# Patient Record
Sex: FEMALE | Race: WHITE | NOT HISPANIC OR LATINO | Employment: PART TIME | URBAN - METROPOLITAN AREA
[De-identification: names, ages, dates, MRNs, and addresses within clinical notes are randomized per-mention and may not be internally consistent; named-entity substitution may affect disease eponyms.]

---

## 2024-04-04 LAB
EXTERNAL ABO GROUPING: NORMAL
EXTERNAL HEPATITIS B SURFACE ANTIGEN: NEGATIVE
EXTERNAL HEPATITIS C AB: NORMAL
EXTERNAL RH FACTOR: POSITIVE
EXTERNAL RUBELLA QUALITATIVE: NORMAL
EXTERNAL SYPHILIS RPR SCREEN: NORMAL
HIV1 P24 AG SERPL QL IA: NORMAL

## 2024-10-10 LAB — EXTERNAL GROUP B STREP ANTIGEN: NORMAL

## 2024-11-09 ENCOUNTER — ANESTHESIA EVENT (OUTPATIENT)
Dept: LABOR AND DELIVERY | Facility: HOSPITAL | Age: 34
End: 2024-11-09
Payer: COMMERCIAL

## 2024-11-09 ENCOUNTER — HOSPITAL ENCOUNTER (INPATIENT)
Facility: HOSPITAL | Age: 34
LOS: 3 days | Discharge: HOME | End: 2024-11-12
Attending: OBSTETRICS & GYNECOLOGY | Admitting: STUDENT IN AN ORGANIZED HEALTH CARE EDUCATION/TRAINING PROGRAM
Payer: COMMERCIAL

## 2024-11-09 ENCOUNTER — ANESTHESIA (OUTPATIENT)
Dept: LABOR AND DELIVERY | Facility: HOSPITAL | Age: 34
End: 2024-11-09
Payer: COMMERCIAL

## 2024-11-09 DIAGNOSIS — Z98.891 STATUS POST PRIMARY LOW TRANSVERSE CESAREAN SECTION: Primary | ICD-10-CM

## 2024-11-09 PROBLEM — Z34.90 PREGNANCY: Status: ACTIVE | Noted: 2024-11-09

## 2024-11-09 LAB
A1 MICROGLOB PLACENTAL VAG QL: POSITIVE
ABO GROUP BLD: NORMAL
ALBUMIN SERPL-MCNC: 3.8 G/DL (ref 3.5–5.2)
ALBUMIN/GLOB SERPL: 1.5 G/DL
ALP SERPL-CCNC: 149 U/L (ref 39–117)
ALT SERPL W P-5'-P-CCNC: 13 U/L (ref 1–33)
ANION GAP SERPL CALCULATED.3IONS-SCNC: 11.6 MMOL/L (ref 5–15)
AST SERPL-CCNC: 18 U/L (ref 1–32)
BASOPHILS # BLD AUTO: 0.03 10*3/MM3 (ref 0–0.2)
BASOPHILS NFR BLD AUTO: 0.3 % (ref 0–1.5)
BILIRUB SERPL-MCNC: <0.2 MG/DL (ref 0–1.2)
BILIRUB UR QL STRIP: NEGATIVE
BLD GP AB SCN SERPL QL: NEGATIVE
BUN SERPL-MCNC: 7 MG/DL (ref 6–20)
BUN/CREAT SERPL: 11.7 (ref 7–25)
CALCIUM SPEC-SCNC: 9.4 MG/DL (ref 8.6–10.5)
CHLORIDE SERPL-SCNC: 108 MMOL/L (ref 98–107)
CLARITY UR: CLEAR
CO2 SERPL-SCNC: 19.4 MMOL/L (ref 22–29)
COLOR UR: YELLOW
CREAT SERPL-MCNC: 0.6 MG/DL (ref 0.57–1)
DEPRECATED RDW RBC AUTO: 40.1 FL (ref 37–54)
EGFRCR SERPLBLD CKD-EPI 2021: 121.7 ML/MIN/1.73
EOSINOPHIL # BLD AUTO: 0.07 10*3/MM3 (ref 0–0.4)
EOSINOPHIL NFR BLD AUTO: 0.6 % (ref 0.3–6.2)
ERYTHROCYTE [DISTWIDTH] IN BLOOD BY AUTOMATED COUNT: 12.9 % (ref 12.3–15.4)
GLOBULIN UR ELPH-MCNC: 2.5 GM/DL
GLUCOSE SERPL-MCNC: 81 MG/DL (ref 65–99)
GLUCOSE UR STRIP-MCNC: NEGATIVE MG/DL
HCT VFR BLD AUTO: 40 % (ref 34–46.6)
HGB BLD-MCNC: 14 G/DL (ref 12–15.9)
HGB UR QL STRIP.AUTO: NEGATIVE
IMM GRANULOCYTES # BLD AUTO: 0.04 10*3/MM3 (ref 0–0.05)
IMM GRANULOCYTES NFR BLD AUTO: 0.4 % (ref 0–0.5)
KETONES UR QL STRIP: NEGATIVE
LEUKOCYTE ESTERASE UR QL STRIP.AUTO: NEGATIVE
LYMPHOCYTES # BLD AUTO: 1.42 10*3/MM3 (ref 0.7–3.1)
LYMPHOCYTES NFR BLD AUTO: 12.9 % (ref 19.6–45.3)
MCH RBC QN AUTO: 30.6 PG (ref 26.6–33)
MCHC RBC AUTO-ENTMCNC: 35 G/DL (ref 31.5–35.7)
MCV RBC AUTO: 87.3 FL (ref 79–97)
MONOCYTES # BLD AUTO: 0.58 10*3/MM3 (ref 0.1–0.9)
MONOCYTES NFR BLD AUTO: 5.3 % (ref 5–12)
NEUTROPHILS NFR BLD AUTO: 8.9 10*3/MM3 (ref 1.7–7)
NEUTROPHILS NFR BLD AUTO: 80.5 % (ref 42.7–76)
NITRITE UR QL STRIP: NEGATIVE
NRBC BLD AUTO-RTO: 0 /100 WBC (ref 0–0.2)
PH UR STRIP.AUTO: 6.5 [PH] (ref 5–8)
PLATELET # BLD AUTO: 173 10*3/MM3 (ref 140–450)
PMV BLD AUTO: 12.2 FL (ref 6–12)
POTASSIUM SERPL-SCNC: 4 MMOL/L (ref 3.5–5.2)
PROT SERPL-MCNC: 6.3 G/DL (ref 6–8.5)
PROT UR QL STRIP: NEGATIVE
RBC # BLD AUTO: 4.58 10*6/MM3 (ref 3.77–5.28)
RH BLD: POSITIVE
RPR SER QL: NORMAL
SODIUM SERPL-SCNC: 139 MMOL/L (ref 136–145)
SP GR UR STRIP: 1.01 (ref 1–1.03)
T&S EXPIRATION DATE: NORMAL
TREPONEMA PALLIDUM IGG+IGM AB [PRESENCE] IN SERUM OR PLASMA BY IMMUNOASSAY: NORMAL
UROBILINOGEN UR QL STRIP: NORMAL
WBC NRBC COR # BLD AUTO: 11.04 10*3/MM3 (ref 3.4–10.8)

## 2024-11-09 PROCEDURE — 86850 RBC ANTIBODY SCREEN: CPT | Performed by: STUDENT IN AN ORGANIZED HEALTH CARE EDUCATION/TRAINING PROGRAM

## 2024-11-09 PROCEDURE — 25810000003 LACTATED RINGERS SOLUTION: Performed by: ANESTHESIOLOGY

## 2024-11-09 PROCEDURE — 86780 TREPONEMA PALLIDUM: CPT | Performed by: STUDENT IN AN ORGANIZED HEALTH CARE EDUCATION/TRAINING PROGRAM

## 2024-11-09 PROCEDURE — 86592 SYPHILIS TEST NON-TREP QUAL: CPT | Performed by: STUDENT IN AN ORGANIZED HEALTH CARE EDUCATION/TRAINING PROGRAM

## 2024-11-09 PROCEDURE — 86900 BLOOD TYPING SEROLOGIC ABO: CPT | Performed by: STUDENT IN AN ORGANIZED HEALTH CARE EDUCATION/TRAINING PROGRAM

## 2024-11-09 PROCEDURE — 85025 COMPLETE CBC W/AUTO DIFF WBC: CPT | Performed by: STUDENT IN AN ORGANIZED HEALTH CARE EDUCATION/TRAINING PROGRAM

## 2024-11-09 PROCEDURE — 81003 URINALYSIS AUTO W/O SCOPE: CPT | Performed by: OBSTETRICS & GYNECOLOGY

## 2024-11-09 PROCEDURE — 86901 BLOOD TYPING SEROLOGIC RH(D): CPT | Performed by: STUDENT IN AN ORGANIZED HEALTH CARE EDUCATION/TRAINING PROGRAM

## 2024-11-09 PROCEDURE — 87086 URINE CULTURE/COLONY COUNT: CPT | Performed by: OBSTETRICS & GYNECOLOGY

## 2024-11-09 PROCEDURE — C1755 CATHETER, INTRASPINAL: HCPCS | Performed by: STUDENT IN AN ORGANIZED HEALTH CARE EDUCATION/TRAINING PROGRAM

## 2024-11-09 PROCEDURE — 25010000002 PENICILLIN G POTASSIUM PER 600000 UNITS: Performed by: STUDENT IN AN ORGANIZED HEALTH CARE EDUCATION/TRAINING PROGRAM

## 2024-11-09 PROCEDURE — 25010000002 ONDANSETRON PER 1 MG: Performed by: STUDENT IN AN ORGANIZED HEALTH CARE EDUCATION/TRAINING PROGRAM

## 2024-11-09 PROCEDURE — 25810000003 LACTATED RINGERS PER 1000 ML: Performed by: STUDENT IN AN ORGANIZED HEALTH CARE EDUCATION/TRAINING PROGRAM

## 2024-11-09 PROCEDURE — 80053 COMPREHEN METABOLIC PANEL: CPT | Performed by: STUDENT IN AN ORGANIZED HEALTH CARE EDUCATION/TRAINING PROGRAM

## 2024-11-09 PROCEDURE — 25010000002 LIDOCAINE-EPINEPHRINE (PF) 1 %-1:200000 SOLUTION: Performed by: ANESTHESIOLOGY

## 2024-11-09 PROCEDURE — 99202 OFFICE O/P NEW SF 15 MIN: CPT | Performed by: OBSTETRICS & GYNECOLOGY

## 2024-11-09 PROCEDURE — 84112 EVAL AMNIOTIC FLUID PROTEIN: CPT | Performed by: OBSTETRICS & GYNECOLOGY

## 2024-11-09 RX ORDER — ERYTHROMYCIN 5 MG/G
OINTMENT OPHTHALMIC
Status: ACTIVE
Start: 2024-11-09 | End: 2024-11-10

## 2024-11-09 RX ORDER — ONDANSETRON 4 MG/1
4 TABLET, ORALLY DISINTEGRATING ORAL EVERY 6 HOURS PRN
Status: DISCONTINUED | OUTPATIENT
Start: 2024-11-09 | End: 2024-11-10 | Stop reason: HOSPADM

## 2024-11-09 RX ORDER — SODIUM CHLORIDE 0.9 % (FLUSH) 0.9 %
10 SYRINGE (ML) INJECTION EVERY 12 HOURS SCHEDULED
Status: DISCONTINUED | OUTPATIENT
Start: 2024-11-09 | End: 2024-11-10 | Stop reason: HOSPADM

## 2024-11-09 RX ORDER — CITRIC ACID/SODIUM CITRATE 334-500MG
30 SOLUTION, ORAL ORAL ONCE
Status: DISCONTINUED | OUTPATIENT
Start: 2024-11-09 | End: 2024-11-10 | Stop reason: HOSPADM

## 2024-11-09 RX ORDER — OXYTOCIN/0.9 % SODIUM CHLORIDE 30/500 ML
2-30 PLASTIC BAG, INJECTION (ML) INTRAVENOUS
Status: DISCONTINUED | OUTPATIENT
Start: 2024-11-09 | End: 2024-11-10 | Stop reason: HOSPADM

## 2024-11-09 RX ORDER — FAMOTIDINE 10 MG/ML
20 INJECTION, SOLUTION INTRAVENOUS 2 TIMES DAILY PRN
Status: DISCONTINUED | OUTPATIENT
Start: 2024-11-09 | End: 2024-11-10 | Stop reason: HOSPADM

## 2024-11-09 RX ORDER — PHYTONADIONE 1 MG/.5ML
INJECTION, EMULSION INTRAMUSCULAR; INTRAVENOUS; SUBCUTANEOUS
Status: ACTIVE
Start: 2024-11-09 | End: 2024-11-10

## 2024-11-09 RX ORDER — TERBUTALINE SULFATE 1 MG/ML
0.25 INJECTION, SOLUTION SUBCUTANEOUS AS NEEDED
Status: DISCONTINUED | OUTPATIENT
Start: 2024-11-09 | End: 2024-11-10 | Stop reason: HOSPADM

## 2024-11-09 RX ORDER — FENTANYL/ROPIVACAINE/NS/PF 2MCG/ML-.2
8 PLASTIC BAG, INJECTION (ML) INJECTION CONTINUOUS
Status: DISCONTINUED | OUTPATIENT
Start: 2024-11-09 | End: 2024-11-10

## 2024-11-09 RX ORDER — FAMOTIDINE 10 MG/ML
20 INJECTION, SOLUTION INTRAVENOUS ONCE AS NEEDED
Status: DISCONTINUED | OUTPATIENT
Start: 2024-11-09 | End: 2024-11-10 | Stop reason: HOSPADM

## 2024-11-09 RX ORDER — ACETAMINOPHEN 325 MG/1
650 TABLET ORAL EVERY 4 HOURS PRN
Status: DISCONTINUED | OUTPATIENT
Start: 2024-11-09 | End: 2024-11-10 | Stop reason: HOSPADM

## 2024-11-09 RX ORDER — PENICILLIN G 3000000 [IU]/50ML
3 INJECTION, SOLUTION INTRAVENOUS EVERY 4 HOURS
Status: DISCONTINUED | OUTPATIENT
Start: 2024-11-09 | End: 2024-11-10 | Stop reason: ALTCHOICE

## 2024-11-09 RX ORDER — SODIUM CHLORIDE, SODIUM LACTATE, POTASSIUM CHLORIDE, CALCIUM CHLORIDE 600; 310; 30; 20 MG/100ML; MG/100ML; MG/100ML; MG/100ML
125 INJECTION, SOLUTION INTRAVENOUS CONTINUOUS
Status: DISCONTINUED | OUTPATIENT
Start: 2024-11-09 | End: 2024-11-10

## 2024-11-09 RX ORDER — FAMOTIDINE 20 MG/1
20 TABLET, FILM COATED ORAL 2 TIMES DAILY PRN
Status: DISCONTINUED | OUTPATIENT
Start: 2024-11-09 | End: 2024-11-10 | Stop reason: HOSPADM

## 2024-11-09 RX ORDER — LIDOCAINE HYDROCHLORIDE 10 MG/ML
0.5 INJECTION, SOLUTION INFILTRATION; PERINEURAL ONCE AS NEEDED
Status: DISCONTINUED | OUTPATIENT
Start: 2024-11-09 | End: 2024-11-10 | Stop reason: HOSPADM

## 2024-11-09 RX ORDER — ONDANSETRON 2 MG/ML
4 INJECTION INTRAMUSCULAR; INTRAVENOUS ONCE AS NEEDED
Status: COMPLETED | OUTPATIENT
Start: 2024-11-09 | End: 2024-11-10

## 2024-11-09 RX ORDER — SODIUM CHLORIDE 0.9 % (FLUSH) 0.9 %
10 SYRINGE (ML) INJECTION AS NEEDED
Status: DISCONTINUED | OUTPATIENT
Start: 2024-11-09 | End: 2024-11-10 | Stop reason: HOSPADM

## 2024-11-09 RX ORDER — MAGNESIUM CARB/ALUMINUM HYDROX 105-160MG
30 TABLET,CHEWABLE ORAL ONCE AS NEEDED
Status: DISCONTINUED | OUTPATIENT
Start: 2024-11-09 | End: 2024-11-10 | Stop reason: HOSPADM

## 2024-11-09 RX ORDER — ONDANSETRON 2 MG/ML
4 INJECTION INTRAMUSCULAR; INTRAVENOUS EVERY 6 HOURS PRN
Status: DISCONTINUED | OUTPATIENT
Start: 2024-11-09 | End: 2024-11-10 | Stop reason: HOSPADM

## 2024-11-09 RX ORDER — EPHEDRINE SULFATE 50 MG/ML
5 INJECTION, SOLUTION INTRAVENOUS
Status: DISCONTINUED | OUTPATIENT
Start: 2024-11-09 | End: 2024-11-10 | Stop reason: HOSPADM

## 2024-11-09 RX ORDER — SODIUM CHLORIDE 9 MG/ML
40 INJECTION, SOLUTION INTRAVENOUS AS NEEDED
Status: DISCONTINUED | OUTPATIENT
Start: 2024-11-09 | End: 2024-11-10 | Stop reason: HOSPADM

## 2024-11-09 RX ADMIN — SODIUM CHLORIDE, SODIUM LACTATE, POTASSIUM CHLORIDE, CALCIUM CHLORIDE 125 ML/HR: 20; 30; 600; 310 INJECTION, SOLUTION INTRAVENOUS at 08:56

## 2024-11-09 RX ADMIN — Medication 12 ML/HR: at 16:51

## 2024-11-09 RX ADMIN — FAMOTIDINE 20 MG: 10 INJECTION INTRAVENOUS at 21:34

## 2024-11-09 RX ADMIN — SODIUM CHLORIDE, SODIUM LACTATE, POTASSIUM CHLORIDE, CALCIUM CHLORIDE 125 ML/HR: 20; 30; 600; 310 INJECTION, SOLUTION INTRAVENOUS at 17:29

## 2024-11-09 RX ADMIN — SODIUM CHLORIDE 5 MILLION UNITS: 900 INJECTION INTRAVENOUS at 09:05

## 2024-11-09 RX ADMIN — LIDOCAINE HYDROCHLORIDE 3 ML: 10; .005 INJECTION, SOLUTION EPIDURAL; INFILTRATION; INTRACAUDAL; PERINEURAL at 16:47

## 2024-11-09 RX ADMIN — SODIUM CHLORIDE, POTASSIUM CHLORIDE, SODIUM LACTATE AND CALCIUM CHLORIDE 1000 ML: 600; 310; 30; 20 INJECTION, SOLUTION INTRAVENOUS at 16:23

## 2024-11-09 RX ADMIN — Medication 2 MILLI-UNITS/MIN: at 10:46

## 2024-11-09 RX ADMIN — PENICILLIN G 3 MILLION UNITS: 3000000 INJECTION, SOLUTION INTRAVENOUS at 13:21

## 2024-11-09 RX ADMIN — PENICILLIN G 3 MILLION UNITS: 3000000 INJECTION, SOLUTION INTRAVENOUS at 17:23

## 2024-11-09 RX ADMIN — Medication 8 ML/HR: at 23:03

## 2024-11-09 RX ADMIN — ONDANSETRON 4 MG: 2 INJECTION, SOLUTION INTRAMUSCULAR; INTRAVENOUS at 21:34

## 2024-11-09 RX ADMIN — PENICILLIN G 3 MILLION UNITS: 3000000 INJECTION, SOLUTION INTRAVENOUS at 21:20

## 2024-11-09 NOTE — ANESTHESIA PREPROCEDURE EVALUATION
Anesthesia Evaluation     Patient summary reviewed and Nursing notes reviewed                Airway   Mallampati: II  TM distance: >3 FB  Neck ROM: full  No difficulty expected  Dental - normal exam     Pulmonary - negative pulmonary ROS and normal exam   Cardiovascular - negative cardio ROS and normal exam  Exercise tolerance: good (4-7 METS)        Neuro/Psych  (+) psychiatric history Anxiety and Depression  GI/Hepatic/Renal/Endo - negative ROS     Musculoskeletal (-) negative ROS    Abdominal    Substance History - negative use     OB/GYN    (+) Pregnant        Other                    Anesthesia Plan    ASA 2     epidural     (40w0d  )    Anesthetic plan, risks, benefits, and alternatives have been provided, discussed and informed consent has been obtained with: patient.    CODE STATUS:    Level Of Support Discussed With: Patient  Code Status (Patient has no pulse and is not breathing): CPR (Attempt to Resuscitate)  Medical Interventions (Patient has pulse or is breathing): Full Support

## 2024-11-09 NOTE — ANESTHESIA PROCEDURE NOTES
Labor Epidural      Patient reassessed immediately prior to procedure    Patient location during procedure: OB  Start Time: 11/9/2024 4:42 PM  Stop Time: 11/9/2024 4:47 PM  Indication:at surgeon's request  Performed By  Anesthesiologist: Ryan Echeverria MD  Preanesthetic Checklist  Completed: patient identified, IV checked, site marked, risks and benefits discussed, surgical consent, monitors and equipment checked, pre-op evaluation and timeout performed  Prep:  Pt Position:sitting  Sterile Tech:cap, mask, sterile barrier and gloves  Prep:chlorhexidine gluconate and isopropyl alcohol  Monitoring:blood pressure monitoring, continuous pulse oximetry and EKG  Epidural Block Procedure:  Approach:midline  Guidance:landmark technique and palpation technique  Location:L3-L4  Needle Type:Tuohy  Needle Gauge:17 G  Loss of Resistance Medium: saline  Loss of Resistance: 5cm  Cath Depth at skin:11 cm  Paresthesia: none  Aspiration:negative  Test Dose:negative  Number of Attempts: 1  Post Assessment:  Dressing:secured with tape and occlusive dressing applied  Pt Tolerance:patient tolerated the procedure well with no apparent complications  Complications:no

## 2024-11-09 NOTE — H&P
[facility]  Obstetric History and Physical    Patient Name: Chary Rendon  :  1990  MRN:  [id number]      Chief Complaint   Patient presents with    Leaking Fluid     ALEKSANDR - leaking fluid since 034       Subjective     Patient is a 34 y.o. female  currently at 40w0d, who presents with SROM.       Her prenatal care has been  complicated by hypothyroid, h/o HSV on suppression    Past Medical History:   Diagnosis Date    ADHD     Depression     Diaphragmatic hernia congenital     repaired at 6 mo of life    Polycystic ovary syndrome     as a teen     Past Surgical History:   Procedure Laterality Date    APPENDECTOMY      DENTAL PROCEDURE      HERNIA REPAIR      diaphragmatic hernia repaired age 6 mo (large incision LUQ)    TONSILLECTOMY       Patient has no known allergies.      Objective       Vital Signs Range for the last 24 hours  Temperature: Temp:  [98.1 °F (36.7 °C)-98.3 °F (36.8 °C)] 98.3 °F (36.8 °C)   Temp Source: Temp src: Oral   BP: BP: (119-132)/(77-88) 119/77   Pulse: Heart Rate:  [70-73] 70   Respirations: Resp:  [16-17] 17                   Physical Examination:     General :  Alert in NAD  Abdomen: Gravid    Presentation: ceph   Cervix: Exam by: Method: sterile vaginal exam performed   Dilation: Cervical Dilation (cm): 0-1   Effacement: Cervical Effacement: 20   Station: Fetal Station: -4       Fetal Heart Rate Assessment   Method: Fetal HR Assessment Method: external   Beats/min: Fetal HR (beats/min): 125   Baseline: Fetal HR Baseline: normal range   Varibility: Fetal HR Variability: moderate (amplitude range 6 to 25 bpm)   Accels: Fetal HR Accelerations: greater than/equal to 15 bpm, lasting at least 15 seconds   Decels: Fetal HR Decelerations: absent   Tracing Category:       Uterine Assessment   Method: Method: external tocotransducer, palpation   Frequency (min): Contraction Frequency (Minutes): 4-6   Ctx Count in 10 min:     Duration:     Intensity: Contraction Intensity: mild  by palpation   Intensity by IUPC:     Resting Tone: Uterine Resting Tone: soft by palpation   Resting Tone by IUPC:     [place] Units:           Results from last 7 days   Lab Units 24  0809   WBC 10*3/mm3 11.04*   HEMOGLOBIN g/dL 14.0   HEMATOCRIT % 40.0   PLATELETS 10*3/mm3 173       Assessment & Plan     1.  Intrauterine pregnancy at 40w0d weeks gestation with SROM. Not yet in active labor.   reactive fetal status.   Continue pitocin augmentation, PCN for GBS pos. Anticipate .  Plan of care has been reviewed with patient and family.  All questions answered.      Pregnancy        H&P updated. The patient was examined and the following changes are noted above.         Vicky Bruce MD  2024  13:09 EST

## 2024-11-10 ENCOUNTER — HOSPITAL ENCOUNTER (OUTPATIENT)
Dept: LABOR AND DELIVERY | Facility: HOSPITAL | Age: 34
Discharge: HOME | End: 2024-11-10

## 2024-11-10 PROBLEM — Z98.891 STATUS POST PRIMARY LOW TRANSVERSE CESAREAN SECTION: Status: ACTIVE | Noted: 2024-11-10

## 2024-11-10 PROBLEM — O42.90 PROM (PREMATURE RUPTURE OF MEMBRANES): Status: ACTIVE | Noted: 2024-11-10

## 2024-11-10 LAB — BACTERIA SPEC AEROBE CULT: NO GROWTH

## 2024-11-10 PROCEDURE — 25010000002 LIDOCAINE-EPINEPHRINE (PF) 1 %-1:200000 SOLUTION: Performed by: ANESTHESIOLOGY

## 2024-11-10 PROCEDURE — 25010000002 ROPIVACAINE PER 1 MG: Performed by: STUDENT IN AN ORGANIZED HEALTH CARE EDUCATION/TRAINING PROGRAM

## 2024-11-10 PROCEDURE — 25010000002 PENICILLIN G POTASSIUM PER 600000 UNITS: Performed by: STUDENT IN AN ORGANIZED HEALTH CARE EDUCATION/TRAINING PROGRAM

## 2024-11-10 PROCEDURE — 25810000003 LACTATED RINGERS PER 1000 ML: Performed by: STUDENT IN AN ORGANIZED HEALTH CARE EDUCATION/TRAINING PROGRAM

## 2024-11-10 PROCEDURE — 25010000002 DROPERIDOL PER 5 MG: Performed by: STUDENT IN AN ORGANIZED HEALTH CARE EDUCATION/TRAINING PROGRAM

## 2024-11-10 PROCEDURE — 25010000002 EPINEPHRINE 1 MG/ML SOLUTION 30 ML VIAL: Performed by: STUDENT IN AN ORGANIZED HEALTH CARE EDUCATION/TRAINING PROGRAM

## 2024-11-10 PROCEDURE — 25010000002 CEFAZOLIN PER 500 MG: Performed by: STUDENT IN AN ORGANIZED HEALTH CARE EDUCATION/TRAINING PROGRAM

## 2024-11-10 PROCEDURE — 25010000002 ONDANSETRON PER 1 MG: Performed by: ANESTHESIOLOGY

## 2024-11-10 PROCEDURE — 25010000002 CLONIDINE PER 1 MG: Performed by: STUDENT IN AN ORGANIZED HEALTH CARE EDUCATION/TRAINING PROGRAM

## 2024-11-10 PROCEDURE — 25010000002 ONDANSETRON PER 1 MG: Performed by: STUDENT IN AN ORGANIZED HEALTH CARE EDUCATION/TRAINING PROGRAM

## 2024-11-10 PROCEDURE — 25810000003 SODIUM CHLORIDE 0.9 % SOLUTION 250 ML FLEX CONT: Performed by: STUDENT IN AN ORGANIZED HEALTH CARE EDUCATION/TRAINING PROGRAM

## 2024-11-10 PROCEDURE — 25010000002 MORPHINE PER 10 MG: Performed by: STUDENT IN AN ORGANIZED HEALTH CARE EDUCATION/TRAINING PROGRAM

## 2024-11-10 PROCEDURE — 88307 TISSUE EXAM BY PATHOLOGIST: CPT

## 2024-11-10 PROCEDURE — 25010000002 AZITHROMYCIN PER 500 MG: Performed by: STUDENT IN AN ORGANIZED HEALTH CARE EDUCATION/TRAINING PROGRAM

## 2024-11-10 PROCEDURE — 25010000002 KETOROLAC TROMETHAMINE PER 15 MG: Performed by: STUDENT IN AN ORGANIZED HEALTH CARE EDUCATION/TRAINING PROGRAM

## 2024-11-10 PROCEDURE — 25010000002 PHENYLEPHRINE 10 MG/ML SOLUTION: Performed by: STUDENT IN AN ORGANIZED HEALTH CARE EDUCATION/TRAINING PROGRAM

## 2024-11-10 PROCEDURE — 25010000002 DIPHENHYDRAMINE PER 50 MG: Performed by: OBSTETRICS & GYNECOLOGY

## 2024-11-10 RX ORDER — CARBOPROST TROMETHAMINE 250 UG/ML
250 INJECTION, SOLUTION INTRAMUSCULAR
Status: DISCONTINUED | OUTPATIENT
Start: 2024-11-10 | End: 2024-11-10 | Stop reason: HOSPADM

## 2024-11-10 RX ORDER — ONDANSETRON 2 MG/ML
INJECTION INTRAMUSCULAR; INTRAVENOUS AS NEEDED
Status: DISCONTINUED | OUTPATIENT
Start: 2024-11-10 | End: 2024-11-10 | Stop reason: SURG

## 2024-11-10 RX ORDER — OXYTOCIN/0.9 % SODIUM CHLORIDE 30/500 ML
999 PLASTIC BAG, INJECTION (ML) INTRAVENOUS ONCE
Status: COMPLETED | OUTPATIENT
Start: 2024-11-10 | End: 2024-11-10

## 2024-11-10 RX ORDER — AMOXICILLIN 250 MG
1 CAPSULE ORAL 2 TIMES DAILY
Status: DISCONTINUED | OUTPATIENT
Start: 2024-11-10 | End: 2024-11-12 | Stop reason: HOSPADM

## 2024-11-10 RX ORDER — ACETAMINOPHEN 500 MG
1000 TABLET ORAL ONCE
Status: COMPLETED | OUTPATIENT
Start: 2024-11-10 | End: 2024-11-10

## 2024-11-10 RX ORDER — FAMOTIDINE 10 MG/ML
20 INJECTION, SOLUTION INTRAVENOUS ONCE AS NEEDED
Status: COMPLETED | OUTPATIENT
Start: 2024-11-10 | End: 2024-11-10

## 2024-11-10 RX ORDER — ALUMINA, MAGNESIA, AND SIMETHICONE 2400; 2400; 240 MG/30ML; MG/30ML; MG/30ML
15 SUSPENSION ORAL EVERY 4 HOURS PRN
Status: DISCONTINUED | OUTPATIENT
Start: 2024-11-10 | End: 2024-11-12 | Stop reason: HOSPADM

## 2024-11-10 RX ORDER — KETOROLAC TROMETHAMINE 30 MG/ML
30 INJECTION, SOLUTION INTRAMUSCULAR; INTRAVENOUS ONCE
Status: DISCONTINUED | OUTPATIENT
Start: 2024-11-10 | End: 2024-11-12 | Stop reason: HOSPADM

## 2024-11-10 RX ORDER — OXYTOCIN/0.9 % SODIUM CHLORIDE 30/500 ML
999 PLASTIC BAG, INJECTION (ML) INTRAVENOUS ONCE
Status: DISCONTINUED | OUTPATIENT
Start: 2024-11-10 | End: 2024-11-12 | Stop reason: HOSPADM

## 2024-11-10 RX ORDER — ONDANSETRON 4 MG/1
4 TABLET, ORALLY DISINTEGRATING ORAL EVERY 8 HOURS PRN
Status: DISCONTINUED | OUTPATIENT
Start: 2024-11-10 | End: 2024-11-12 | Stop reason: HOSPADM

## 2024-11-10 RX ORDER — DROPERIDOL 2.5 MG/ML
INJECTION, SOLUTION INTRAMUSCULAR; INTRAVENOUS AS NEEDED
Status: DISCONTINUED | OUTPATIENT
Start: 2024-11-10 | End: 2024-11-10 | Stop reason: SURG

## 2024-11-10 RX ORDER — KETOROLAC TROMETHAMINE 30 MG/ML
30 INJECTION, SOLUTION INTRAMUSCULAR; INTRAVENOUS EVERY 6 HOURS PRN
Status: DISCONTINUED | OUTPATIENT
Start: 2024-11-10 | End: 2024-11-12 | Stop reason: HOSPADM

## 2024-11-10 RX ORDER — OXYTOCIN/0.9 % SODIUM CHLORIDE 30/500 ML
250 PLASTIC BAG, INJECTION (ML) INTRAVENOUS CONTINUOUS
Status: DISPENSED | OUTPATIENT
Start: 2024-11-10 | End: 2024-11-10

## 2024-11-10 RX ORDER — METHYLERGONOVINE MALEATE 0.2 MG/ML
200 INJECTION INTRAVENOUS ONCE AS NEEDED
Status: DISCONTINUED | OUTPATIENT
Start: 2024-11-10 | End: 2024-11-12 | Stop reason: HOSPADM

## 2024-11-10 RX ORDER — IBUPROFEN 600 MG/1
600 TABLET, FILM COATED ORAL EVERY 6 HOURS
Status: DISCONTINUED | OUTPATIENT
Start: 2024-11-12 | End: 2024-11-12 | Stop reason: HOSPADM

## 2024-11-10 RX ORDER — LEVOTHYROXINE SODIUM 25 UG/1
25 TABLET ORAL DAILY
Status: DISCONTINUED | OUTPATIENT
Start: 2024-11-11 | End: 2024-11-12 | Stop reason: HOSPADM

## 2024-11-10 RX ORDER — MORPHINE SULFATE 1 MG/ML
INJECTION, SOLUTION EPIDURAL; INTRATHECAL; INTRAVENOUS AS NEEDED
Status: DISCONTINUED | OUTPATIENT
Start: 2024-11-10 | End: 2024-11-10 | Stop reason: SURG

## 2024-11-10 RX ORDER — HYDROXYZINE HYDROCHLORIDE 50 MG/1
50 TABLET, FILM COATED ORAL NIGHTLY PRN
Status: DISCONTINUED | OUTPATIENT
Start: 2024-11-10 | End: 2024-11-12 | Stop reason: HOSPADM

## 2024-11-10 RX ORDER — SIMETHICONE 80 MG
80 TABLET,CHEWABLE ORAL 4 TIMES DAILY PRN
Status: DISCONTINUED | OUTPATIENT
Start: 2024-11-10 | End: 2024-11-12 | Stop reason: HOSPADM

## 2024-11-10 RX ORDER — DIPHENHYDRAMINE HCL 25 MG
25 CAPSULE ORAL EVERY 4 HOURS PRN
Status: DISCONTINUED | OUTPATIENT
Start: 2024-11-10 | End: 2024-11-12 | Stop reason: HOSPADM

## 2024-11-10 RX ORDER — TRANEXAMIC ACID 10 MG/ML
1000 INJECTION, SOLUTION INTRAVENOUS ONCE AS NEEDED
Status: DISCONTINUED | OUTPATIENT
Start: 2024-11-10 | End: 2024-11-12 | Stop reason: HOSPADM

## 2024-11-10 RX ORDER — SODIUM CHLORIDE, SODIUM LACTATE, POTASSIUM CHLORIDE, CALCIUM CHLORIDE 600; 310; 30; 20 MG/100ML; MG/100ML; MG/100ML; MG/100ML
INJECTION, SOLUTION INTRAVENOUS CONTINUOUS PRN
Status: DISCONTINUED | OUTPATIENT
Start: 2024-11-10 | End: 2024-11-10 | Stop reason: SURG

## 2024-11-10 RX ORDER — DIPHENHYDRAMINE HYDROCHLORIDE 50 MG/ML
12.5 INJECTION INTRAMUSCULAR; INTRAVENOUS ONCE
Status: COMPLETED | OUTPATIENT
Start: 2024-11-10 | End: 2024-11-10

## 2024-11-10 RX ORDER — OXYCODONE HYDROCHLORIDE 5 MG/1
5 TABLET ORAL EVERY 4 HOURS PRN
Status: DISCONTINUED | OUTPATIENT
Start: 2024-11-10 | End: 2024-11-12 | Stop reason: HOSPADM

## 2024-11-10 RX ORDER — ACETAMINOPHEN 500 MG
1000 TABLET ORAL EVERY 6 HOURS
Status: COMPLETED | OUTPATIENT
Start: 2024-11-10 | End: 2024-11-11

## 2024-11-10 RX ORDER — KETOROLAC TROMETHAMINE 15 MG/ML
15 INJECTION, SOLUTION INTRAMUSCULAR; INTRAVENOUS EVERY 6 HOURS
Status: COMPLETED | OUTPATIENT
Start: 2024-11-11 | End: 2024-11-11

## 2024-11-10 RX ORDER — CALCIUM CARBONATE 500 MG/1
1 TABLET, CHEWABLE ORAL EVERY 4 HOURS PRN
Status: DISCONTINUED | OUTPATIENT
Start: 2024-11-10 | End: 2024-11-12 | Stop reason: HOSPADM

## 2024-11-10 RX ORDER — OXYTOCIN/0.9 % SODIUM CHLORIDE 30/500 ML
125 PLASTIC BAG, INJECTION (ML) INTRAVENOUS CONTINUOUS PRN
Status: DISCONTINUED | OUTPATIENT
Start: 2024-11-10 | End: 2024-11-12 | Stop reason: HOSPADM

## 2024-11-10 RX ORDER — PHENYLEPHRINE HYDROCHLORIDE 10 MG/ML
INJECTION INTRAVENOUS AS NEEDED
Status: DISCONTINUED | OUTPATIENT
Start: 2024-11-10 | End: 2024-11-10 | Stop reason: SURG

## 2024-11-10 RX ORDER — MISOPROSTOL 200 UG/1
600 TABLET ORAL ONCE AS NEEDED
Status: DISCONTINUED | OUTPATIENT
Start: 2024-11-10 | End: 2024-11-12 | Stop reason: HOSPADM

## 2024-11-10 RX ORDER — BISACODYL 5 MG/1
10 TABLET, DELAYED RELEASE ORAL DAILY PRN
Status: DISCONTINUED | OUTPATIENT
Start: 2024-11-10 | End: 2024-11-12 | Stop reason: HOSPADM

## 2024-11-10 RX ORDER — METHYLERGONOVINE MALEATE 0.2 MG/ML
200 INJECTION INTRAVENOUS ONCE AS NEEDED
Status: DISCONTINUED | OUTPATIENT
Start: 2024-11-10 | End: 2024-11-10 | Stop reason: HOSPADM

## 2024-11-10 RX ORDER — TRANEXAMIC ACID 100 MG/ML
INJECTION, SOLUTION INTRAVENOUS AS NEEDED
Status: DISCONTINUED | OUTPATIENT
Start: 2024-11-10 | End: 2024-11-10 | Stop reason: SURG

## 2024-11-10 RX ORDER — ESCITALOPRAM OXALATE 10 MG/1
10 TABLET ORAL DAILY
Status: DISCONTINUED | OUTPATIENT
Start: 2024-11-11 | End: 2024-11-12 | Stop reason: HOSPADM

## 2024-11-10 RX ORDER — ACETAMINOPHEN 325 MG/1
650 TABLET ORAL EVERY 6 HOURS
Status: DISCONTINUED | OUTPATIENT
Start: 2024-11-12 | End: 2024-11-12 | Stop reason: HOSPADM

## 2024-11-10 RX ORDER — CARBOPROST TROMETHAMINE 250 UG/ML
250 INJECTION, SOLUTION INTRAMUSCULAR ONCE AS NEEDED
Status: DISCONTINUED | OUTPATIENT
Start: 2024-11-10 | End: 2024-11-12 | Stop reason: HOSPADM

## 2024-11-10 RX ORDER — OXYCODONE HYDROCHLORIDE 10 MG/1
10 TABLET ORAL EVERY 4 HOURS PRN
Status: DISCONTINUED | OUTPATIENT
Start: 2024-11-10 | End: 2024-11-12 | Stop reason: HOSPADM

## 2024-11-10 RX ORDER — OXYTOCIN/0.9 % SODIUM CHLORIDE 30/500 ML
250 PLASTIC BAG, INJECTION (ML) INTRAVENOUS CONTINUOUS
Status: ACTIVE | OUTPATIENT
Start: 2024-11-10 | End: 2024-11-11

## 2024-11-10 RX ORDER — MISOPROSTOL 200 UG/1
800 TABLET ORAL ONCE AS NEEDED
Status: DISCONTINUED | OUTPATIENT
Start: 2024-11-10 | End: 2024-11-10 | Stop reason: HOSPADM

## 2024-11-10 RX ADMIN — DIPHENHYDRAMINE HYDROCHLORIDE 12.5 MG: 50 INJECTION, SOLUTION INTRAMUSCULAR; INTRAVENOUS at 06:52

## 2024-11-10 RX ADMIN — ONDANSETRON 4 MG: 2 INJECTION, SOLUTION INTRAMUSCULAR; INTRAVENOUS at 16:01

## 2024-11-10 RX ADMIN — KETOROLAC TROMETHAMINE 30 MG: 30 INJECTION, SOLUTION INTRAMUSCULAR at 19:17

## 2024-11-10 RX ADMIN — Medication 125 ML/HR: at 18:56

## 2024-11-10 RX ADMIN — LIDOCAINE HYDROCHLORIDE 5 ML: 10; .005 INJECTION, SOLUTION EPIDURAL; INFILTRATION; INTRACAUDAL; PERINEURAL at 16:29

## 2024-11-10 RX ADMIN — SODIUM CHLORIDE, POTASSIUM CHLORIDE, SODIUM LACTATE AND CALCIUM CHLORIDE: 600; 310; 30; 20 INJECTION, SOLUTION INTRAVENOUS at 16:20

## 2024-11-10 RX ADMIN — ACETAMINOPHEN 1000 MG: 500 TABLET ORAL at 23:40

## 2024-11-10 RX ADMIN — Medication 8 ML/HR: at 12:24

## 2024-11-10 RX ADMIN — MORPHINE SULFATE 2 MG: 1 INJECTION, SOLUTION EPIDURAL; INTRATHECAL; INTRAVENOUS at 17:54

## 2024-11-10 RX ADMIN — PENICILLIN G 3 MILLION UNITS: 3000000 INJECTION, SOLUTION INTRAVENOUS at 02:12

## 2024-11-10 RX ADMIN — LIDOCAINE HYDROCHLORIDE 5 ML: 10; .005 INJECTION, SOLUTION EPIDURAL; INFILTRATION; INTRACAUDAL; PERINEURAL at 16:26

## 2024-11-10 RX ADMIN — PENICILLIN G 3 MILLION UNITS: 3000000 INJECTION, SOLUTION INTRAVENOUS at 10:48

## 2024-11-10 RX ADMIN — LIDOCAINE HYDROCHLORIDE 5 ML: 10; .005 INJECTION, SOLUTION EPIDURAL; INFILTRATION; INTRACAUDAL; PERINEURAL at 17:52

## 2024-11-10 RX ADMIN — SODIUM CHLORIDE, SODIUM LACTATE, POTASSIUM CHLORIDE, CALCIUM CHLORIDE 125 ML/HR: 20; 30; 600; 310 INJECTION, SOLUTION INTRAVENOUS at 12:26

## 2024-11-10 RX ADMIN — AZITHROMYCIN MONOHYDRATE 500 MG: 500 INJECTION, POWDER, LYOPHILIZED, FOR SOLUTION INTRAVENOUS at 16:46

## 2024-11-10 RX ADMIN — DROPERIDOL 0.62 MG: 2.5 INJECTION, SOLUTION INTRAMUSCULAR; INTRAVENOUS at 17:29

## 2024-11-10 RX ADMIN — TRANEXAMIC ACID 1000 MG: 100 INJECTION, SOLUTION INTRAVENOUS at 17:29

## 2024-11-10 RX ADMIN — PHENYLEPHRINE HYDROCHLORIDE 100 MCG: 10 INJECTION INTRAVENOUS at 17:38

## 2024-11-10 RX ADMIN — ONDANSETRON 4 MG: 2 INJECTION INTRAMUSCULAR; INTRAVENOUS at 17:37

## 2024-11-10 RX ADMIN — Medication 999 ML/HR: at 17:13

## 2024-11-10 RX ADMIN — CLONIDINE HYDROCHLORIDE 40 ML: 0.1 INJECTION, SOLUTION EPIDURAL at 18:01

## 2024-11-10 RX ADMIN — PENICILLIN G 3 MILLION UNITS: 3000000 INJECTION, SOLUTION INTRAVENOUS at 14:53

## 2024-11-10 RX ADMIN — MORPHINE SULFATE 3 MG: 1 INJECTION, SOLUTION EPIDURAL; INTRATHECAL; INTRAVENOUS at 17:20

## 2024-11-10 RX ADMIN — PENICILLIN G 3 MILLION UNITS: 3000000 INJECTION, SOLUTION INTRAVENOUS at 06:19

## 2024-11-10 RX ADMIN — FAMOTIDINE 20 MG: 10 INJECTION INTRAVENOUS at 16:01

## 2024-11-10 RX ADMIN — LIDOCAINE HYDROCHLORIDE 5 ML: 10; .005 INJECTION, SOLUTION EPIDURAL; INFILTRATION; INTRACAUDAL; PERINEURAL at 16:31

## 2024-11-10 RX ADMIN — Medication 8 ML/HR: at 06:19

## 2024-11-10 RX ADMIN — SODIUM CHLORIDE 2 G: 900 INJECTION INTRAVENOUS at 16:24

## 2024-11-10 RX ADMIN — ONDANSETRON 4 MG: 2 INJECTION, SOLUTION INTRAMUSCULAR; INTRAVENOUS at 10:48

## 2024-11-10 RX ADMIN — ACETAMINOPHEN 1000 MG: 500 TABLET ORAL at 16:02

## 2024-11-10 RX ADMIN — SODIUM CHLORIDE, SODIUM LACTATE, POTASSIUM CHLORIDE, CALCIUM CHLORIDE 125 ML/HR: 20; 30; 600; 310 INJECTION, SOLUTION INTRAVENOUS at 02:12

## 2024-11-10 NOTE — PLAN OF CARE
Problem: Adult Inpatient Plan of Care  Goal: Plan of Care Review  Outcome: Progressing  Flowsheets (Taken 11/9/2024 3527)  Progress: improving  Outcome Evaluation: VSS, pt is comfortable with epidural  Goal: Patient-Specific Goal (Individualized)  Outcome: Progressing  Goal: Absence of Hospital-Acquired Illness or Injury  Outcome: Progressing  Goal: Optimal Comfort and Wellbeing  Outcome: Progressing  Goal: Readiness for Transition of Care  Outcome: Progressing     Problem: Skin Injury Risk Increased  Goal: Skin Health and Integrity  Outcome: Progressing     Problem: Anesthesia/Analgesia, Neuraxial  Goal: Safe, Effective Infusion Delivery  Outcome: Progressing  Goal: Stable Patient-Fetal Status  Outcome: Progressing  Goal: Absence of Infection Signs and Symptoms  Outcome: Progressing  Goal: Nausea and Vomiting Relief  Outcome: Progressing  Goal: Optimal Pain Control and Function  Outcome: Progressing  Goal: Effective Oxygenation and Ventilation  Outcome: Progressing  Goal: Baseline Motor Function Return  Outcome: Progressing  Goal: Effective Urinary Elimination  Outcome: Progressing   Goal Outcome Evaluation:           Progress: improving  Outcome Evaluation: VSS, pt is comfortable with epidural

## 2024-11-10 NOTE — H&P
To pt bedside- comfortable with epidural. Pitocin at 22u. Contractions inadequate. Cat 1 FHR tracing.SVE 5-6/90/0 with fetal head molding noted. Additonally some maternal vulvar edema also noted. D/w patient that at this time meets criteria for failed augmentation of labor 12-18hrs without change, membranes ruptured, on pitocin) and therefore recommend proceeding with delivery via primary  section. Reviewed r/b/a including but not limited to pain. Bleeding, infection, damage to surrounding structures. All questions answered. Pt amenable to this plan of care. Pitocin discontinued. Anesthesia, OR, nursing staff  aware. Plan Kefzol + Azithro for antibiotic ppx.    Kenzie Schultz MD  11/10/24  16:15 EST

## 2024-11-10 NOTE — PROGRESS NOTES
Assumed care of patient as of  0700. Presented 11/9/24 with SROM- has been on pitocin for augmentation. Has been 5cm since approx midnight- understands currently protracted latent labor however given not et in active labor has 12-18hrs unchanged (on pitocin) before would recommend proceeding with primary c/s for failed labor augmentation/arrest of latent phase.     Kenzie Schultz MD  11/10/24  10:04 EST

## 2024-11-10 NOTE — L&D DELIVERY NOTE
Psychiatric   Section Operative Note    Patient Name: Chary Rendon  :  1990  MRN:  [id number]      Date of procedure:  11/10/2024       Pre-Operative Dx:   1.  IUP at 40w1d weeks   2. Failure to Progress        Postoperative Dx:  Same        Procedure: primary   via Pfannensteil     Surgeon: Kenzie Schultz MD      Assistant: Gabriela Rucker MD     Anesthesia: Epidural     EBL:    Quantitative EBL:  600mL    Quantitative Blood Loss (mL): 580 mL         Specimens: A: Placenta     Findings:                           Amniotic Fluid - clear  Placenta Intact, 3 VC  Uterus normal  Tubes and ovaries normal bilaterally              Infant:           Gender: Viable male infant     Weight: 3950 g (8 lb 11.3 oz)    Apgars: 8  @ 1 minute /     9  @ 5 minutes                 Indication for C/Section:     Failure to Progress              Procedure Details:    The patient was taken to the operating room where epidural anesthesia was found to be adequate. She was then placed in the dorsal supine position with left lateral tilt and prepped and draped in the usual sterile fashion. Muñoz catheter was placed. Time out was performed. A Pfannenstiel skin incision was then made with the scalpel and carried through to the underlying layer of fascia. The fascia was incised in the midline and the incision extended laterally with the Warren scissors. The superior aspect of the fascial incision was was then grasped with the Kocher clamps, elevated, and the underlying rectus muscles dissected off bluntly. Attention was then turned to the inferior aspect of this incision, which, in similar fashion, was grasped with the Kocher clamps, and the rectus muscles dissected off bluntly. The rectus muscles were then  in the midline, and the peritoneum identified, tented up, and entered  bluntly.  On inspection, it was noted that the bladder was distended nearly to the level of the uterine fundus and the bladder serosa  had been disrupted with entry to the peritoneum. A new Muñoz catheter was placed with successful decompression of  the bladder then noted. The peritoneal incision was then extended superiorly and inferiorly with good visualization of the bladder.     The bladder blade was then inserted and the vesicouterine peritoneum identified grasped with the pick-ups and entered sharply with the Metzenbaum scissors. This incision was then extended laterally and the bladder flap created digitally. The bladder blade was then reinserted and the lower uterine segment incised in a transverse fashion with the scalpel. The uterine incision was then extended superolaterally bluntly. The bladder blade was removed and the infant's head delivered atraumatically, followed by delivery of the rest of the infant's body. There were no nuchal cords. The umbilical cord was clamped and cut and the infant was passed off to waiting pediatricians. Cord blood  was obtained and sent to pathology.     The placenta was then removed manually, the uterus exteriorized, and cleared of all clot and debris. The uterine incision was repaired with 0-Monocryl in a running locked fashion. A second imbricating layer of the same suture was used to obtain excellent hemostasis. The uterus was returned to the abdomen. The posterior cul de sac and paracolic gutters were cleared of all clot and debris. The uterus was re-examined and found to be hemostatic. At this time the disrupted bladder serosa was repaired with a simple running suture of 3-0 Vicryl. The bladder integrity was confirmed with instillation of sterile milk without any spill noted.  We then turned our attention to the muscle and fasical edges. Small bleeders were cauterized with Bovie electrocautery.  The fascia was reapproximated with 0-Vicryl in a running fashion. The subcutaneous tissue was infiltrated with an anesthetic solution containing ropivacaine, clonidine, and epinephrine and then reapproximated  with 3-0 Monocryl. The skin was closed with 4-0 Monocryl.    Instrument, sponge, and needle counts were correct prior the abdominal closure and at the conclusion of the case. Mother and baby  to recovery room in stable condition.            Complications:     Disruption of serosal layer of the dome of the bladder which was repaired          Antibiotics: cefazolin (Ancef) and Azithryomycin (Azithromax)                      Kenzie Schultz MD  11/10/2024  16:52 EST

## 2024-11-11 LAB
BASOPHILS # BLD AUTO: 0.05 10*3/MM3 (ref 0–0.2)
BASOPHILS NFR BLD AUTO: 0.3 % (ref 0–1.5)
DEPRECATED RDW RBC AUTO: 39.9 FL (ref 37–54)
EOSINOPHIL # BLD AUTO: 0.08 10*3/MM3 (ref 0–0.4)
EOSINOPHIL NFR BLD AUTO: 0.4 % (ref 0.3–6.2)
ERYTHROCYTE [DISTWIDTH] IN BLOOD BY AUTOMATED COUNT: 12.3 % (ref 12.3–15.4)
HCT VFR BLD AUTO: 36.2 % (ref 34–46.6)
HGB BLD-MCNC: 12.1 G/DL (ref 12–15.9)
IMM GRANULOCYTES # BLD AUTO: 0.09 10*3/MM3 (ref 0–0.05)
IMM GRANULOCYTES NFR BLD AUTO: 0.5 % (ref 0–0.5)
LYMPHOCYTES # BLD AUTO: 1.59 10*3/MM3 (ref 0.7–3.1)
LYMPHOCYTES NFR BLD AUTO: 8.2 % (ref 19.6–45.3)
MCH RBC QN AUTO: 29.6 PG (ref 26.6–33)
MCHC RBC AUTO-ENTMCNC: 33.4 G/DL (ref 31.5–35.7)
MCV RBC AUTO: 88.5 FL (ref 79–97)
MONOCYTES # BLD AUTO: 1.13 10*3/MM3 (ref 0.1–0.9)
MONOCYTES NFR BLD AUTO: 5.8 % (ref 5–12)
NEUTROPHILS NFR BLD AUTO: 16.55 10*3/MM3 (ref 1.7–7)
NEUTROPHILS NFR BLD AUTO: 84.8 % (ref 42.7–76)
NRBC BLD AUTO-RTO: 0 /100 WBC (ref 0–0.2)
PLATELET # BLD AUTO: 158 10*3/MM3 (ref 140–450)
PMV BLD AUTO: 11.4 FL (ref 6–12)
RBC # BLD AUTO: 4.09 10*6/MM3 (ref 3.77–5.28)
WBC NRBC COR # BLD AUTO: 19.49 10*3/MM3 (ref 3.4–10.8)

## 2024-11-11 PROCEDURE — 25010000002 KETOROLAC TROMETHAMINE PER 15 MG: Performed by: STUDENT IN AN ORGANIZED HEALTH CARE EDUCATION/TRAINING PROGRAM

## 2024-11-11 PROCEDURE — 85025 COMPLETE CBC W/AUTO DIFF WBC: CPT | Performed by: STUDENT IN AN ORGANIZED HEALTH CARE EDUCATION/TRAINING PROGRAM

## 2024-11-11 RX ADMIN — ESCITALOPRAM OXALATE 10 MG: 10 TABLET, FILM COATED ORAL at 08:33

## 2024-11-11 RX ADMIN — SENNOSIDES AND DOCUSATE SODIUM 1 TABLET: 50; 8.6 TABLET ORAL at 08:33

## 2024-11-11 RX ADMIN — ACETAMINOPHEN 1000 MG: 500 TABLET ORAL at 18:36

## 2024-11-11 RX ADMIN — ACETAMINOPHEN 1000 MG: 500 TABLET ORAL at 12:25

## 2024-11-11 RX ADMIN — SENNOSIDES AND DOCUSATE SODIUM 1 TABLET: 50; 8.6 TABLET ORAL at 20:50

## 2024-11-11 RX ADMIN — KETOROLAC TROMETHAMINE 15 MG: 15 INJECTION, SOLUTION INTRAMUSCULAR; INTRAVENOUS at 20:50

## 2024-11-11 RX ADMIN — KETOROLAC TROMETHAMINE 15 MG: 15 INJECTION, SOLUTION INTRAMUSCULAR; INTRAVENOUS at 01:39

## 2024-11-11 RX ADMIN — KETOROLAC TROMETHAMINE 15 MG: 15 INJECTION, SOLUTION INTRAMUSCULAR; INTRAVENOUS at 14:55

## 2024-11-11 RX ADMIN — ACETAMINOPHEN 1000 MG: 500 TABLET ORAL at 06:29

## 2024-11-11 RX ADMIN — LEVOTHYROXINE SODIUM 25 MCG: 25 TABLET ORAL at 08:33

## 2024-11-11 RX ADMIN — KETOROLAC TROMETHAMINE 15 MG: 15 INJECTION, SOLUTION INTRAMUSCULAR; INTRAVENOUS at 08:28

## 2024-11-11 NOTE — OP NOTE
[facility]   Section Operative Note    Patient Name: Chary Rendon  :  1990  MRN:  [id number]      Date of procedure:  11/10/2024       Pre-Operative Dx:   1.  IUP at 40w1d weeks   2. Failure to Progress        Postoperative Dx:  Same        Procedure: primary   via Pfannensteil     Surgeon: Kenzie Schultz MD      Assistant: Gabriela Rucker MD     Anesthesia: Epidural     EBL:    Quantitative EBL:  600mL     580 mL         Specimens: A: Placenta     Findings:                           Amniotic Fluid - clear  Placenta Intact, 3 VC  Uterus normal  Tubes and ovaries normal bilaterally              Infant:           Gender: Viable male infant     Weight: 3950 g (8 lb 11.3 oz)    Apgars: 8  @ 1 minute /     9  @ 5 minutes                 Indication for C/Section:     Failure to Progress              Procedure Details:    The patient was taken to the operating room where epidural anesthesia was found to be adequate. She was then placed in the dorsal supine position with left lateral tilt and prepped and draped in the usual sterile fashion. Muñoz catheter was placed. Time out was performed. A Pfannenstiel skin incision was then made with the scalpel and carried through to the underlying layer of fascia. The fascia was incised in the midline and the incision extended laterally with the Warren scissors. The superior aspect of the fascial incision was was then grasped with the Kocher clamps, elevated, and the underlying rectus muscles dissected off bluntly. Attention was then turned to the inferior aspect of this incision, which, in similar fashion, was grasped with the Kocher clamps, and the rectus muscles dissected off bluntly. The rectus muscles were then  in the midline, and the peritoneum identified, tented up, and entered  bluntly.  On inspection, it was noted that the bladder was distended nearly to the level of the uterine fundus and the bladder serosa had been disrupted with entry  to the peritoneum. A new Muñoz catheter was placed with successful decompression of  the bladder then noted. The peritoneal incision was then extended superiorly and inferiorly with good visualization of the bladder.     The bladder blade was then inserted and the vesicouterine peritoneum identified grasped with the pick-ups and entered sharply with the Metzenbaum scissors. This incision was then extended laterally and the bladder flap created digitally. The bladder blade was then reinserted and the lower uterine segment incised in a transverse fashion with the scalpel. The uterine incision was then extended superolaterally bluntly. The bladder blade was removed and the infant's head delivered atraumatically, followed by delivery of the rest of the infant's body. There were no nuchal cords. The umbilical cord was clamped and cut and the infant was passed off to waiting pediatricians. Cord blood  was obtained and sent to pathology.     The placenta was then removed manually, the uterus exteriorized, and cleared of all clot and debris. The uterine incision was repaired with 0-Monocryl in a running locked fashion. A second imbricating layer of the same suture was used to obtain excellent hemostasis. The uterus was returned to the abdomen. The posterior cul de sac and paracolic gutters were cleared of all clot and debris. The uterus was re-examined and found to be hemostatic. At this time the disrupted bladder serosa was repaired with a simple running suture of 3-0 Vicryl. The bladder integrity was confirmed with instillation of sterile milk without any spill noted.  We then turned our attention to the muscle and fasical edges. Small bleeders were cauterized with Bovie electrocautery.  The fascia was reapproximated with 0-Vicryl in a running fashion. The subcutaneous tissue was infiltrated with an anesthetic solution containing ropivacaine, clonidine, and epinephrine and then reapproximated with 3-0 Monocryl. The skin  was closed with 4-0 Monocryl.    Instrument, sponge, and needle counts were correct prior the abdominal closure and at the conclusion of the case. Mother and baby  to recovery room in stable condition.            Complications:     Disruption of serosal layer of the dome of the bladder which was repaired          Antibiotics: cefazolin (Ancef) and Azithryomycin (Azithromax)                      Kenzie Schultz MD  11/10/2024  18:33 EST

## 2024-11-11 NOTE — ANESTHESIA POSTPROCEDURE EVALUATION
"Patient: Chary Rendon    Procedure Summary       Date: 24 Room / Location:  [facility] DELIVERY   [facility] DELIV[facility]    Anesthesia Start:  Anesthesia Stop: 11/10/24 1825    Procedure:  SECTION PRIMARY (Abdomen) Diagnosis: (Failure to Progress)    Surgeons: Kenzie Schultz MD Provider: Deven Waddell MD    Anesthesia Type: epidural ASA Status: 2            Anesthesia Type: epidural    Vitals  Vitals Value Taken Time   /72 11/10/24 2030   Temp 36.6 °C (97.8 °F) 11/10/24 2030   Pulse 60 11/10/24 2030   Resp 16 11/10/24 2030   SpO2 95 % 11/10/24 2030   Vitals shown include unfiled device data.        Post Anesthesia Care and Evaluation    Patient location during evaluation: PACU  Level of consciousness: awake  Pain management: adequate    Airway patency: patent  Anesthetic complications: No anesthetic complications  PONV Status: controlled  Cardiovascular status: acceptable  Respiratory status: acceptable  Hydration status: acceptable    Comments: /72   Pulse 66   Temp 36.6 °C (97.8 °F) (Oral)   Resp 16   Ht 180.3 cm (71\")   Wt 91.8 kg (202 lb 6.4 oz)   SpO2 95%   Breastfeeding Yes   BMI 28.23 kg/m²       "

## 2024-11-11 NOTE — PROGRESS NOTES
[facility]   PROGRESS NOTE    Patient Name: Chary Rendon  :  1990  MRN:  [id number]      Post-Op Day 1 S/P    Delivered a male infant.  Subjective     Patient reports:    Pain is well controlled. Voiding and ambulating without difficulty.    Tolerating po. Lochia normal.       The patient plans to breastfeed, lactation referral.         Objective       Vitals: Vital Signs Range for the last 24 hours  Temperature: Temp:  [97.6 °F (36.4 °C)-99 °F (37.2 °C)] 97.6 °F (36.4 °C)   Temp Source: Temp src: Oral   BP: BP: (113-137)/(55-95) 131/76   Pulse: Heart Rate:  [61-89] 73   Respirations: Resp:  [16-19] 16         Intake/Output Summary (Last 24 hours) at 2024 1246  Last data filed at 2024 1200  Gross per 24 hour   Intake 1841 ml   Output 3455 ml   Net -1614 ml                                              Physical Exam     General Alert and awake, in NAD      CV Regular rate and rhythm      Lungs clear to auscultation bilaterally        Abdomen Soft, non-distended, fundus firm,  2fb below umbilicus, appropriately tender      Incision  Dressing clean, dry and intact.      Extremities  trace edema, calves NT               LABS: Results from last 7 days   Lab Units 24  0659 24  0809   WBC 10*3/mm3 19.49* 11.04*   HEMOGLOBIN g/dL 12.1 14.0   HEMATOCRIT % 36.2 40.0   PLATELETS 10*3/mm3 158 173         Prenatal labs results reviewed:  Yes   Rubella:  immune  Rh Status:    RH type   Date Value Ref Range Status   2024 Positive  Final                       Assessment & Plan   :     1. POD 1 S/P C/S: Hemodynamically stable.  Doing well.  Continue routine care.     2. Male infant: doing weill; desire circumcision      Pregnancy    PROM (premature rupture of membranes)    Status post primary low transverse  section          Tahira Pereira, APRN  2024  12:46 EST

## 2024-11-11 NOTE — LACTATION NOTE
Pt wanting assistance with latching baby. Baby is latching well with nipple shield. Dad is giving baby some formula supplement per syringe.  gave pt hand pump with instructions on use and cleaning.  assisted pt with pumping and 8 cc's of colostrum was easily obtained in a few minutes.  educated parents on syringe finger feeding. Baby tolerated colostrum well and then started BF again. Encouraged pt to use hand pump if she feels she needs to give baby supplement after BF. Call LC as needed.    Lactation Consult Note    Evaluation Completed: 2024 14:59 EST  Patient Name: Chary Rendon  :  1990  MRN:  [id number]     REFERRAL  INFORMATION:                          Date of Referral: 24   Person Making Referral: patient          DELIVERY HISTORY:        Skin to skin initiation date/time: 11/10/2024 5:21 PM  Skin to skin end date/time: 11/10/2024 5:27 PM       MATERNAL ASSESSMENT:     Breast Shape: round (24)  Breast Density: soft (24)  Areola: elastic (24)  Nipples: flat, inverted (24)                INFANT ASSESSMENT:  Information for the patient's :  Kosta Rendon [[id number]]   No past medical history on file.                                                                                                  MATERNAL INFANT FEEDING:     Maternal Emotional State: receptive (24)  Infant Positioning: cross-cradle (24)   Signs of Milk Transfer: deep jaw excursions noted (with nipple shield) (24)  Pain with Feeding: no (24)                       Latch Assistance: minimal assistance (24)                               EQUIPMENT TYPE:                                 BREAST PUMPING:  Breast Pumping Interventions: frequent pumping encouraged (3-4 times a day while using nipple shield) (24)       LACTATION REFERRALS:

## 2024-11-11 NOTE — LACTATION NOTE
Pt called for assistance with latching baby. She has flat and somewhat inverted nipples. Baby is having difficulty grasping nipple.  gave pt nipple shield with instructions on use and cleaning. Baby is latching well at this time. Educated on the importance of deep latching and ways to achieve it. Pt is able to hand express colostrum easily. Pt wanting to give some formula supplement for now so  showed parents how to syringe feed it to baby while he is BF. Encouraged to BF at least every 2-3 hours for 10-15 min on each breast. Discussed pumping a few times a day for stimulation since pt is using nipple shield. Encouraged f/u in [facility] within  one week.    Lactation Consult Note    Evaluation Completed: 2024 11:33 EST  Patient Name: Chary Rendon  :  1990  MRN:  [id number]     REFERRAL  INFORMATION:                          Date of Referral: 24   Person Making Referral: patient          DELIVERY HISTORY:        Skin to skin initiation date/time: 11/10/2024 5:21 PM  Skin to skin end date/time: 11/10/2024 5:27 PM       MATERNAL ASSESSMENT:     Breast Shape: round (24)  Breast Density: soft (24)  Areola: elastic (24)  Nipples: flat, inverted (24)                INFANT ASSESSMENT:  Information for the patient's :  Kosta Rendon [[id number]]   No past medical history on file.                                                                                                  MATERNAL INFANT FEEDING:     Maternal Emotional State: receptive (24)  Infant Positioning: cross-cradle (24)   Signs of Milk Transfer: deep jaw excursions noted (with nipple shield) (24)  Pain with Feeding: no (24)                       Latch Assistance: minimal assistance (24)                               EQUIPMENT TYPE:                                 BREAST PUMPING:  Breast Pumping Interventions: frequent pumping  encouraged (3-4 times a day while using nipple shield) (11/11/24 1100)       LACTATION REFERRALS:

## 2024-11-11 NOTE — PLAN OF CARE
Goal Outcome Evaluation:            Pain within acceptable level with medication, bleeding at a minimum and without clots, incision dressing clean and intact, vitals stable

## 2024-11-11 NOTE — LACTATION NOTE
P1 term baby. Pt reports baby is latching and she is giving baby some formula supplement also. Educated on supply and demand. Encouraged to BF 10-15 min on each breast first before giving formula. Encouraged pt to call with feeding if wanting LC to check latch. Pt has personal breast pump  Lactation Consult Note    Evaluation Completed: 2024 08:16 EST  Patient Name: Chary Rendon  :  1990  MRN:  [id number]     REFERRAL  INFORMATION:                                         DELIVERY HISTORY:        Skin to skin initiation date/time: 11/10/2024 5:21 PM  Skin to skin end date/time: 11/10/2024 5:27 PM       MATERNAL ASSESSMENT:                               INFANT ASSESSMENT:  Information for the patient's :  Kosta Rendon [[id number]]   No past medical history on file.                                                                                                  MATERNAL INFANT FEEDING:                                                                       EQUIPMENT TYPE:                                 BREAST PUMPING:          LACTATION REFERRALS:

## 2024-11-11 NOTE — PAYOR COMM NOTE
"Chary Rendon (34 y.o. Female)       Date of Birth   1990    Social Security Number       Address   [address] [place] [address]    Home Phone   [phone]    MRN   [id number]       Buddhist   Yazidism    Marital Status                               Admission Date   11/9/24    Admission Type   Emergency    Admitting Provider   Kenzie Schultz MD    Attending Provider   Bola Jorgensen MD    Department, Room/Bed   [address], S[address]/1       Discharge Date       Discharge Disposition       Discharge Destination                                 Attending Provider: Bola Jorgensen MD    Allergies: No Known Allergies    Isolation: None   Infection: None   Code Status: CPR    Ht: 180.3 cm (71\")   Wt: 91.8 kg (202 lb 6.4 oz)    Admission Cmt: None   Principal Problem: Pregnancy [Z34.90]                   Active Insurance as of 11/9/2024       Primary Coverage       Payor Plan Insurance Group Employer/Plan Group    [employer]HE[employer] BLUE CROSS [employer]UE [employer]OSS BLUE [employer]IELD PPO [id number]       Payor Plan Address Payor Plan Phone Number Payor Plan Fax Number Effective Dates    [address] [phone]  12/1/2023 - None Entered    [facility] [id number]         Subscriber Name Subscriber Birth Date Member ID       EDDIE RENDON 5/27/1988 [id number]                     Emergency Contacts        (Rel.) Home Phone Work Phone Mobile Phone    Eddie Rendon (Spouse) -- -- [phone]    Maty Sow (Mother) -- -- [phone]              Insurance Information                  [employer]UE [employer] PPO Phone: [phone]    Subscriber: Eddie Rendon Subscriber#: [id number]    Group#: [id number] Precert#: --    Authorization#: -- Effective Date: --          Problem List             Codes Noted - Resolved       Hospital    PROM (premature rupture of membranes) ICD-10-CM: O42.90  ICD-9-CM: 658.10 11/10/2024 - Present    Status post primary " low transverse  section ICD-10-CM: Z98.891  ICD-9-CM: V45.89 11/10/2024 - Present    * (Principal) Pregnancy ICD-10-CM: Z34.90  ICD-9-CM: V22.2 2024 - Present       Non-Hospital    ADHD ICD-10-CM: F90.9  ICD-9-CM: 314.01 2020 - Present        History & Physical        Kenzie Schultz MD at 11/10/24 1608          To pt bedside- comfortable with epidural. Pitocin at 22u. Contractions inadequate. Cat 1 FHR tracing.SVE -/0 with fetal head molding noted. Additonally some maternal vulvar edema also noted. D/w patient that at this time meets criteria for failed augmentation of labor 12-18hrs without change, membranes ruptured, on pitocin) and therefore recommend proceeding with delivery via primary  section. Reviewed r/b/a including but not limited to pain. Bleeding, infection, damage to surrounding structures. All questions answered. Pt amenable to this plan of care. Pitocin discontinued. Anesthesia, OR, nursing staff  aware. Plan Kefzol + Azithro for antibiotic ppx.    Kenzie Schultz MD  11/10/24  16:15 EST     Electronically signed by Kenzie Schultz MD at 11/10/24 1615       Vicky Bruce MD at 24 1308          [facility]  Obstetric History and Physical    Patient Name: Chary Rendon  :  1990  MRN:  [id number]      Chief Complaint   Patient presents with    Leaking Fluid     ALEKSANDR - leaking fluid since 0345       Subjective    Patient is a 34 y.o. female  currently at 40w0d, who presents with SROM.       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      Objective      Vital Signs Range for the last 24 hours  Temperature: Temp:  [98.1 °F (36.7 °C)-98.3 °F (36.8 °C)] 98.3 °F (36.8 °C)   Temp Source: Temp src: Oral   BP: BP: (119-132)/(77-88) 119/77   Pulse: Heart Rate:  [70-73] 70   Respirations: Resp:  [16-17] 17                   Physical Examination:     General :  Alert in NAD  Abdomen: Gravid    Presentation: ceph   Cervix: Exam by: Method: sterile vaginal exam performed   Dilation: Cervical Dilation (cm): 0-1   Effacement: Cervical Effacement: 20   Station: Fetal Station: -4       Fetal Heart Rate Assessment   Method: Fetal HR Assessment Method: external   Beats/min: Fetal HR (beats/min): 125   Baseline: Fetal HR Baseline: normal range   Varibility: Fetal HR Variability: moderate (amplitude range 6 to 25 bpm)   Accels: Fetal HR Accelerations: greater than/equal to 15 bpm, lasting at least 15 seconds   Decels: Fetal HR Decelerations: absent   Tracing Category:       Uterine Assessment   Method: Method: external tocotransducer, palpation   Frequency (min): Contraction Frequency (Minutes): 4-6   Ctx Count in 10 min:     Duration:     Intensity: Contraction Intensity: mild by palpation   Intensity by IUPC:     Resting Tone: Uterine Resting Tone: soft by palpation   Resting Tone by IUPC:     [place] Units:           Results from last 7 days   Lab Units 24  0809   WBC 10*3/mm3 11.04*   HEMOGLOBIN g/dL 14.0   HEMATOCRIT % 40.0   PLATELETS 10*3/mm3 173       Assessment & Plan    1.  Intrauterine pregnancy at 40w0d weeks gestation with SROM. Not yet in active labor.   reactive fetal status.   Continue pitocin augmentation, PCN for GBS pos. Anticipate .  Plan of care has been reviewed with patient and family.  All questions answered.      Pregnancy        H&P updated. The patient was examined and the following changes are noted above.         Vicky Bruce MD  2024  13:09 EST        Electronically signed by Vicky Bruce MD  at 24 1311       Facility-Administered Medications as of 2024   Medication Dose Route Frequency Provider Last Rate Last Admin    [COMPLETED] acetaminophen (TYLENOL) tablet 1,000 mg  1,000 mg Oral Once Kenzie Schultz MD   1,000 mg at 11/10/24 1602    acetaminophen (TYLENOL) tablet 1,000 mg  1,000 mg Oral Q6H Kenzie Schultz MD   1,000 mg at 24 0629    Followed by    acetaminophen (TYLENOL) tablet 650 mg  650 mg Oral Q6H Kenzie Schultz MD        all purpose nipple ointment 1 Application  1 Application Topical 4x Daily PRN Kenzie Schultz MD        aluminum-magnesium hydroxide-simethicone (MAALOX MAX) 400-400-40 MG/5ML suspension 15 mL  15 mL Oral Q4H PRN Kenzie Schultz MD        Or    calcium carbonate (TUMS) chewable tablet 500 mg (200 mg elemental)  1 tablet Oral Q4H PRN Kenzie Schultz MD        [COMPLETED] azithromycin (ZITHROMAX) 500 mg in sodium chloride 0.9 % 250 mL IVPB-VTB  500 mg Intravenous Once Kenzie Schultz MD   500 mg at 11/10/24 1646    bisacodyl (DULCOLAX) EC tablet 10 mg  10 mg Oral Daily PRN Kenzie Schultz MD        carboprost (HEMABATE) injection 250 mcg  250 mcg Intramuscular Once PRN Kenzie Schultz MD        [COMPLETED] ceFAZolin 2000 mg IVPB in 100 mL NS (MBP)  2 g Intravenous Once Kenzie Schultz MD   2 g at 11/10/24 1624    diphenhydrAMINE (BENADRYL) capsule 25 mg  25 mg Oral Q4H PRN Kenzie Schultz MD        [COMPLETED] diphenhydrAMINE (BENADRYL) injection 12.5 mg  12.5 mg Intravenous Once Vicky Bruce MD   12.5 mg at 11/10/24 0652    [] erythromycin (ROMYCIN) 5 MG/GM ophthalmic ointment  - ADS Override Pull             escitalopram (LEXAPRO) tablet 10 mg  10 mg Oral Daily Kenzie Schultz MD        [COMPLETED] famotidine (PEPCID) injection 20 mg  20 mg Intravenous Once PRN Kenzie Schultz MD   20 mg at 11/10/24 1601    hydrOXYzine (ATARAX) tablet 50 mg  50 mg Oral Nightly PRN Kenzie Schultz MD        ketorolac (TORADOL) injection 15 mg  15 mg  Intravenous Q6H Kenzie Schultz MD   15 mg at 24 0139    Followed by    [START ON 2024] ibuprofen (ADVIL,MOTRIN) tablet 600 mg  600 mg Oral Q6H Kenzie Schultz MD        [COMPLETED] incisional cocktail 6 - Ropivacaine 0.5% (50mL), Clonidine HCl 80mcg/0.8 mL,  Epinephrine 1:1000 (0.3mL), N/S 0.9% (50mL) solution 100 mL  100 mL Injection Once Kenzie Schultz MD   40 mL at 11/10/24 1801    ketorolac (TORADOL) injection 30 mg  30 mg Intravenous Once Kenzie Schultz MD        ketorolac (TORADOL) injection 30 mg  30 mg Intravenous Q6H PRN Kenzie Schultz MD   30 mg at 11/10/24 1917    [] lactated ringers bolus 1,000 mL  1,000 mL Intravenous Once PRN Kenzie Schultz MD        [COMPLETED] lactated ringers bolus 1,000 mL  1,000 mL Intravenous Once Ryan Echeverria MD 1,000 mL/hr at 24 1623 1,000 mL at 24 1623    lanolin topical 1 Application  1 Application Topical Q1H PRN Kenzie Schultz MD        levothyroxine (SYNTHROID, LEVOTHROID) tablet 25 mcg  25 mcg Oral Daily Kenzie Schultz MD        methylergonovine (METHERGINE) injection 200 mcg  200 mcg Intramuscular Once PRN Kenzie Schultz MD        miSOPROStol (CYTOTEC) tablet 600 mcg  600 mcg Oral Once PRN Kenzie Schultz MD        [COMPLETED] ondansetron (ZOFRAN) injection 4 mg  4 mg Intravenous Once PRN Ryan Echeverria MD   4 mg at 11/10/24 1048    ondansetron ODT (ZOFRAN-ODT) disintegrating tablet 4 mg  4 mg Oral Q8H PRN Kenzie Schultz MD        oxyCODONE (ROXICODONE) immediate release tablet 5 mg  5 mg Oral Q4H PRN Kenzie Schultz MD        Or    oxyCODONE (ROXICODONE) immediate release tablet 10 mg  10 mg Oral Q4H PRN Kenzie Schultz MD        [COMPLETED] oxytocin (PITOCIN) 30 units in 0.9% sodium chloride 500 mL (premix)  999 mL/hr Intravenous Once Kenzie Schultz  mL/hr at 11/10/24 1730 250 mL/hr at 11/10/24 173    Followed by    [] oxytocin (PITOCIN) 30 units in 0.9% sodium chloride 500 mL (premix)  250  mL/hr Intravenous Continuous Kenzie Schultz MD        oxytocin (PITOCIN) 30 units in 0.9% sodium chloride 500 mL (premix)  999 mL/hr Intravenous Once Kenzie Schultz MD        Followed by    [] oxytocin (PITOCIN) 30 units in 0.9% sodium chloride 500 mL (premix)  250 mL/hr Intravenous Continuous Kenzie Schultz MD        oxytocin (PITOCIN) 30 units in 0.9% sodium chloride 500 mL (premix)  125 mL/hr Intravenous Continuous PRN Kenzie Schultz  mL/hr at 11/10/24 1856 125 mL/hr at 11/10/24 1856    [COMPLETED] penicillin G potassium 5 Million Units in sodium chloride 0.9 % 100 mL MBP  5 Million Units Intravenous Once Kenzie Schultz MD   5 Million Units at 24    [] phytonadione (VITAMIN K) 1 MG/0.5ML injection  - ADS Override Pull             sennosides-docusate (PERICOLACE) 8.6-50 MG per tablet 1 tablet  1 tablet Oral BID Kenzie Schultz MD        simethicone (MYLICON) chewable tablet 80 mg  80 mg Oral 4x Daily PRN Kenzie Schultz MD        tranexamic acid 1000 mg in 100 mL 0.7% NaCl infusion (premix)  1,000 mg Intravenous Once PRN Kenzie Schultz MD         Lab Results (last 72 hours)       Procedure Component Value Units Date/Time    Urine Culture - Urine, Urine, Clean Catch [[id number]]  (Normal) Collected: 24 0538    Specimen: Urine, Clean Catch Updated: 11/10/24 1217     Urine Culture No growth    Group B Streptococcus Culture - Swab, Vaginal/Rectum [[id number]] Resulted: 10/10/24    Specimen: Swab from Vaginal/Rectum Updated: 24 1901     External Strep Group B Ag POS    RPR Qualitative with Reflex to Quant [[id number]]  (Normal) Collected: 24 0852    Specimen: Blood Updated: 24 1515     RPR Non-Reactive    HIV-1 Antibody, EIA [[id number]] Resulted: 24    Specimen: Blood Updated: 24 0946     External HIV Antibody Non-Reactive    Hepatitis C Antibody [[id number]] Resulted: 24    Specimen: Blood Updated: 24 0946     External Hepatitis C  Ab neg    Hepatitis B Surface Antigen [[id number]] Resulted: 04/04/24    Specimen: Blood Updated: 11/09/24 0946     External Hepatitis B Surface Ag Negative    RPR Qualitative with Reflex to Quant [[id number]] Resulted: 04/04/24    Specimen: Blood Updated: 11/09/24 0946     External RPR Non-Reactive    Rubella Antibody, IgG [[id number]] Resulted: 04/04/24    Specimen: Blood Updated: 11/09/24 0946     External Rubella Qual Immune    Comprehensive Metabolic Panel [[id number]]  (Abnormal) Collected: 11/09/24 0852    Specimen: Blood Updated: 11/09/24 0920     Glucose 81 mg/dL      BUN 7 mg/dL      Creatinine 0.60 mg/dL      Sodium 139 mmol/L      Potassium 4.0 mmol/L      Chloride 108 mmol/L      CO2 19.4 mmol/L      Calcium 9.4 mg/dL      Total Protein 6.3 g/dL      Albumin 3.8 g/dL      ALT (SGPT) 13 U/L      AST (SGOT) 18 U/L      Alkaline Phosphatase 149 U/L      Total Bilirubin <0.2 mg/dL      Globulin 2.5 gm/dL      A/G Ratio 1.5 g/dL      BUN/Creatinine Ratio 11.7     Anion Gap 11.6 mmol/L      eGFR 121.7 mL/min/1.73     Narrative:      GFR Normal >60  Chronic Kidney Disease <60  Kidney Failure <15      Treponema pallidum AB w/Reflex RPR [[id number]]  (Normal) Collected: 11/09/24 0809    Specimen: Blood Updated: 11/09/24 0906     Treponemal AB Total Non-Reactive    Narrative:      Reactive results will reflex RPR testing.    CBC & Differential [[id number]]  (Abnormal) Collected: 11/09/24 0809    Specimen: Blood Updated: 11/09/24 0824    Narrative:      The following orders were created for panel order CBC & Differential.  Procedure                               Abnormality         Status                     ---------                               -----------         ------                     CBC Auto Differential[[id number]]        Abnormal            Final result                 Please view results for these tests on the individual orders.    CBC Auto Differential [[id number]]  (Abnormal) Collected: 11/09/24 0809     "Specimen: Blood Updated: 11/09/24 0824     WBC 11.04 10*3/mm3      RBC 4.58 10*6/mm3      Hemoglobin 14.0 g/dL      Hematocrit 40.0 %      MCV 87.3 fL      MCH 30.6 pg      MCHC 35.0 g/dL      RDW 12.9 %      RDW-SD 40.1 fl      MPV 12.2 fL      Platelets 173 10*3/mm3      Neutrophil % 80.5 %      Lymphocyte % 12.9 %      Monocyte % 5.3 %      Eosinophil % 0.6 %      Basophil % 0.3 %      Immature Grans % 0.4 %      Neutrophils, Absolute 8.90 10*3/mm3      Lymphocytes, Absolute 1.42 10*3/mm3      Monocytes, Absolute 0.58 10*3/mm3      Eosinophils, Absolute 0.07 10*3/mm3      Basophils, Absolute 0.03 10*3/mm3      Immature Grans, Absolute 0.04 10*3/mm3      nRBC 0.0 /100 WBC     Rapid Assay For ROM - Amniotic Fluid, Amniotic Sac [[id number]]  (Abnormal) Collected: 11/09/24 0536    Specimen: Amniotic Fluid from Amniotic Sac Updated: 11/09/24 0604     Rupture of Membranes Positive    Urinalysis With Culture If Indicated - Urine, Clean Catch [[id number]]  (Normal) Collected: 11/09/24 0538    Specimen: Urine, Clean Catch Updated: 11/09/24 0547     Color, UA Yellow     Appearance, UA Clear     pH, UA 6.5     Specific Gravity, UA 1.010     Glucose, UA Negative     Ketones, UA Negative     Bilirubin, UA Negative     Blood, UA Negative     Protein, UA Negative     Leuk Esterase, UA Negative     Nitrite, UA Negative     Urobilinogen, UA 0.2 E.U./dL    Narrative:      In absence of clinical symptoms, the presence of pyuria, bacteria, and/or nitrites on the urinalysis result does not correlate with infection.  Urine microscopic not indicated.          Imaging Results (Last 72 Hours)       ** No results found for the last 72 hours. **          ECG/EMG Results (last 72 hours)       ** No results found for the last 72 hours. **          Orders (last 72 hrs)        Start     Ordered    11/12/24 0230  ibuprofen (ADVIL,MOTRIN) tablet 600 mg  Every 6 Hours        Placed in \"Followed by\" Linked Group    11/10/24 2205    11/11/24 4240  " "acetaminophen (TYLENOL) tablet 650 mg  Every 6 Hours        Placed in \"Followed by\" Linked Group    11/10/24 2205    11/11/24 0900  escitalopram (LEXAPRO) tablet 10 mg  Daily         11/10/24 2205    11/11/24 0900  levothyroxine (SYNTHROID, LEVOTHROID) tablet 25 mcg  Daily         11/10/24 2205    11/11/24 0800  Ambulate Patient  2 Times Daily      Comments: After anesthesia wears off.    11/10/24 2205    11/11/24 0600  Discontinue Indwelling Urinary Catheter in AM  Once         11/10/24 2205    11/11/24 0600  Wound Care  Per Order Details        Comments: Postop Day 1. Remove Dressing & Leave Incision Open to Air.    11/10/24 2205    11/11/24 0600  CBC & Differential  Morning Draw         11/10/24 2205    11/11/24 0600  CBC Auto Differential  PROCEDURE ONCE         11/10/24 2205    11/11/24 0230  ketorolac (TORADOL) injection 15 mg  Every 6 Hours        Placed in \"Followed by\" Linked Group    11/10/24 2205    11/11/24 0000  Remove Abdominal Dressing  Once         11/10/24 2205    11/10/24 2330  acetaminophen (TYLENOL) tablet 1,000 mg  Every 6 Hours        Placed in \"Followed by\" Linked Group    11/10/24 2205    11/10/24 2315  oxytocin (PITOCIN) 30 units in 0.9% sodium chloride 500 mL (premix)  Continuous        Placed in \"Followed by\" Linked Group    11/10/24 2205    11/10/24 2300  ketorolac (TORADOL) injection 30 mg  Once         11/10/24 2205    11/10/24 2300  Incentive spirometry RT  Every Hour       11/10/24 2205    11/10/24 2300  sennosides-docusate (PERICOLACE) 8.6-50 MG per tablet 1 tablet  2 Times Daily         11/10/24 2205    11/10/24 2206  Nurse May Remove Epidural Catheter After Delivery  Continuous         11/10/24 2205    11/10/24 2206  Transfer to Postpartum When Criteria Met  Continuous         11/10/24 2205    11/10/24 2206  Urinary Catheter Care  Every Shift,   Status:  Canceled       11/10/24 2205    11/10/24 2205  oxytocin (PITOCIN) 30 units in 0.9% sodium chloride 500 mL (premix)  Once      " "  Placed in \"Followed by\" Linked Group    11/10/24 2205    11/10/24 2205  Code Status and Medical Interventions: CPR (Attempt to Resuscitate); Full  Continuous         11/10/24 2205    11/10/24 2205  Vital Signs Per Hospital Policy  Per Hospital Policy         11/10/24 2205    11/10/24 2205  Notify Physician  Until Discontinued         11/10/24 2205    11/10/24 2205  Patient May Shower  Per Order Details        Comments: After Anesthesia Wears Off & With Assistance    11/10/24 2205    11/10/24 2205  Diet: Regular/House; Fluid Consistency: Thin (IDDSI 0)  Diet Effective Now         11/10/24 2205    11/10/24 2205  Advance Diet As Tolerated -Regular  Until Discontinued         11/10/24 2205    11/10/24 2205  I/O  Every Shift       11/10/24 2205    11/10/24 2205  Fundal and Lochia Check  Per Order Details        Comments: Every 30 Minutes x2, Every 1 Hour x4, Every 4 Hours x24 Hours, Then Every Shift.    11/10/24 2205    11/10/24 2205  Weigh Patient  Once         11/10/24 2205    11/10/24 2205  Continue Indwelling Urinary Catheter Already in Place  Once         11/10/24 2205    11/10/24 2205  Notify Provider if Bladder Distention Continues  Until Discontinued         11/10/24 2205    11/10/24 2205  KPad  Per Order Details        Comments: PRN Pain    11/10/24 2205    11/10/24 2205  Turn Cough Deep Breathe  Once         11/10/24 2205    11/10/24 2205  Encourage early intake of PO fluids  Continuous         11/10/24 2205    11/10/24 2205  Ambulate Patient 3-5 times per day (with or without Muoñz)  Every Shift       11/10/24 2205    11/10/24 2205  Apply Abdominal Binding Until Discontinued  Until Discontinued         11/10/24 2205    11/10/24 2205  \"If patient tolerates food and liquids after completion of second bag of Pitocin, saline lock IV and discontinue IV fluid infusions.  Once         11/10/24 2205    11/10/24 2205  Breast pump to bed  Once         11/10/24 2205    11/10/24 2205  If indicated -- Please administer " "RH Immunoglobulin based on results of cord blood evaluation and fetal screen lab tests, pharmacy to dispense  Per Order Details        Comments: See Process Instructions For Reference Range Details.    11/10/24 2205    11/10/24 2205  Place Sequential Compression Device  Once         11/10/24 2205    11/10/24 2205  Maintain Sequential Compression Device  Continuous         11/10/24 2205    11/10/24 2205  VTE Prophylaxis Not Indicated: Low Risk; No Risk Factors (0)  Once         11/10/24 2205    11/10/24 2205  Acknowledge Patient is on PCA Pump  Once         11/10/24 2205    11/10/24 2204  aluminum-magnesium hydroxide-simethicone (MAALOX MAX) 400-400-40 MG/5ML suspension 15 mL  Every 4 Hours PRN        Placed in \"Or\" Linked Group    11/10/24 2205    11/10/24 2204  calcium carbonate (TUMS) chewable tablet 500 mg (200 mg elemental)  Every 4 Hours PRN        Placed in \"Or\" Linked Group    11/10/24 2205    11/10/24 2204  hydrOXYzine (ATARAX) tablet 50 mg  Nightly PRN         11/10/24 2205    11/10/24 2204  oxyCODONE (ROXICODONE) immediate release tablet 5 mg  Every 4 Hours PRN        Placed in \"Or\" Linked Group    11/10/24 2205    11/10/24 2204  oxyCODONE (ROXICODONE) immediate release tablet 10 mg  Every 4 Hours PRN        Placed in \"Or\" Linked Group    11/10/24 2205    11/10/24 2204  bisacodyl (DULCOLAX) EC tablet 10 mg  Daily PRN         11/10/24 2205    11/10/24 2204  simethicone (MYLICON) chewable tablet 80 mg  4 Times Daily PRN         11/10/24 2205    11/10/24 2204  ondansetron ODT (ZOFRAN-ODT) disintegrating tablet 4 mg  Every 8 Hours PRN         11/10/24 2205    11/10/24 2204  lanolin topical 1 Application  Every 1 Hour PRN         11/10/24 2205    11/10/24 2204  all purpose nipple ointment 1 Application  4 Times Daily PRN         11/10/24 2205    11/10/24 2204  carboprost (HEMABATE) injection 250 mcg  Once As Needed         11/10/24 2205    11/10/24 2204  miSOPROStol (CYTOTEC) tablet 600 mcg  Once As Needed      " "   11/10/24 2205    11/10/24 2204  methylergonovine (METHERGINE) injection 200 mcg  Once As Needed         11/10/24 2205    11/10/24 2204  diphenhydrAMINE (BENADRYL) capsule 25 mg  Every 4 Hours PRN         11/10/24 2205    11/10/24 1854  ketorolac (TORADOL) injection 30 mg  Every 6 Hours PRN         11/10/24 1854    11/10/24 1854  oxytocin (PITOCIN) 30 units in 0.9% sodium chloride 500 mL (premix)  Continuous PRN         11/10/24 1854    11/10/24 1824  Transfer Patient  Once         11/10/24 1833    11/10/24 1808  Specimen To Pathology  Once         11/10/24 1807    11/10/24 1645  oxytocin (PITOCIN) 30 units in 0.9% sodium chloride 500 mL (premix)  Continuous        Placed in \"Followed by\" Linked Group    11/10/24 1531    11/10/24 1630  oxytocin (PITOCIN) 30 units in 0.9% sodium chloride 500 mL (premix)  Once        Placed in \"Followed by\" Linked Group    11/10/24 1531    11/10/24 1630  acetaminophen (TYLENOL) tablet 1,000 mg  Once         11/10/24 1531    11/10/24 1630  ceFAZolin 2000 mg IVPB in 100 mL NS (MBP)  Once         11/10/24 1531    11/10/24 1630  azithromycin (ZITHROMAX) 500 mg in sodium chloride 0.9 % 250 mL IVPB-VTB  Once         11/10/24 1531    11/10/24 1630  incisional cocktail 6 - Ropivacaine 0.5% (50mL), Clonidine HCl 80mcg/0.8 mL,  Epinephrine 1:1000 (0.3mL), N/S 0.9% (50mL) solution 100 mL  Once         11/10/24 1531    11/10/24 1532  Notify Provider (Specified)  Until Discontinued,   Status:  Canceled         11/10/24 1531    11/10/24 1532  Vital Signs Per Hospital Policy  Per Hospital Policy,   Status:  Canceled         11/10/24 1531    11/10/24 1532  Fundal & Lochia Check  Per Order Details,   Status:  Canceled        Comments: Every 15 Minutes x4, Then Every 30 Minutes x2, Then Every Shift    11/10/24 1531    11/10/24 1532  Diet: Regular/House; Fluid Consistency: Thin (IDDSI 0)  Diet Effective Now,   Status:  Canceled         11/10/24 1531    11/10/24 1532  Advance Diet As Tolerated -  Until " "Discontinued,   Status:  Canceled         11/10/24 1531    11/10/24 1531  tranexamic acid 1000 mg in 100 mL 0.7% NaCl infusion (premix)  Once As Needed         11/10/24 1531    11/10/24 1531  Fundal & Lochia Check  Every Shift,   Status:  Canceled       11/10/24 1531    11/10/24 1531  methylergonovine (METHERGINE) injection 200 mcg  Once As Needed,   Status:  Discontinued         11/10/24 1531    11/10/24 1531  carboprost (HEMABATE) injection 250 mcg  Every 15 Minutes PRN,   Status:  Discontinued         11/10/24 1531    11/10/24 1531  miSOPROStol (CYTOTEC) tablet 800 mcg  Once As Needed,   Status:  Discontinued         11/10/24 1531    11/10/24 1530  Insert Indwelling Urinary Catheter  Once,   Status:  Canceled        Comments: Follow Protocol As Outlined in Process Instructions (Open Order Report to View Full Instructions)   Placed in \"And\" Linked Group    11/10/24 1531    11/10/24 1530  Assess Need for Indwelling Urinary Catheter - Follow Removal Protocol  Continuous,   Status:  Canceled        Comments: Follow Protocol As Outlined in Process Instructions (Open Order Report to View Full Instructions)   Placed in \"And\" Linked Group    11/10/24 1531    11/10/24 1530  Urinary Catheter Care  Every Shift,   Status:  Canceled      Placed in \"And\" Linked Group    11/10/24 1531    11/10/24 1530  Abdominal Prep With Clippers  Once,   Status:  Canceled         11/10/24 1531    11/10/24 1530  Chlorhexadine Skin Prep Unless Otherwise Indicated  Once,   Status:  Canceled         11/10/24 1531    11/10/24 1530  SCD (Sequential Compression Devices)  Once,   Status:  Canceled         11/10/24 1531    11/10/24 1530  POC Glucose Once  Once,   Status:  Canceled        Comments: Complete no more than 45 minutes prior to patient eating      11/10/24 1531    11/10/24 1530  Document Gatorade Consumption Prior to Admission (Yes or No)  Once,   Status:  Canceled         11/10/24 1531    11/10/24 1530  Betadine/CHG \"Vaginal Prep\"  Once,   " "Status:  Canceled         11/10/24 1531    11/10/24 1530  NPO Diet NPO Type: Strict NPO  Diet Effective Now,   Status:  Canceled         11/10/24 1531    11/10/24 1530  Inpatient Anesthesiology Consult  Once,   Status:  Canceled        Specialty:  Anesthesiology  Provider:  (Not yet assigned)    11/10/24 1531    11/10/24 1529  famotidine (PEPCID) injection 20 mg  Once As Needed         11/10/24 1531    11/10/24 0745  diphenhydrAMINE (BENADRYL) injection 12.5 mg  Once         11/10/24 0646    11/09/24 2328  phytonadione (VITAMIN K) 1 MG/0.5ML injection  - ADS Override Pull        Note to Pharmacy: Created by cabinet override    11/09/24 2328 11/09/24 2328  erythromycin (ROMYCIN) 5 MG/GM ophthalmic ointment  - ADS Override Pull        Note to Pharmacy: Created by cabinet override    11/09/24 2328 11/09/24 1126  penicillin G in iso-osmotic dextrose IVPB 3 million units (premix)  Every 4 Hours,   Status:  Discontinued        Placed in \"Followed by\" Linked Group    11/09/24 0728    11/09/24 1030  lactated ringers bolus 1,000 mL  Once         11/09/24 0938 11/09/24 1030  fentaNYL 2mcg/mL and ropivacaine 0.2% in NS epidural 100mL  Continuous,   Status:  Discontinued         11/09/24 0938 11/09/24 1030  Sod Citrate-Citric Acid (BICITRA) oral solution 30 mL  Once,   Status:  Discontinued         11/09/24 0938 11/09/24 0938  Vital Signs Per Anesthesia Guidelines  Per Order Details,   Status:  Canceled        Comments: Every 2 Minutes x5, Every 5 Minutes x4, Then If Stable Every 15 Minutes    11/09/24 0938 11/09/24 0938  Start IV (16 or 18 Gauge)  Once,   Status:  Canceled         11/09/24 0938 11/09/24 0938  Fetal Heart Rate Monitor  Once,   Status:  Canceled         11/09/24 0938    11/09/24 0938  Nurse or Anesthesiologist to Remain With Patient for 15 Minutes Following Dosing  Continuous,   Status:  Canceled         11/09/24 0938 11/09/24 0938  Facilitate Maternal Position on Side & Maintain Uterine " "Displacement  Continuous,   Status:  Canceled         11/09/24 0938 11/09/24 0938  Consult Anesthesia Prior to Changing Epidural Infusion / Rate  Continuous,   Status:  Canceled         11/09/24 0938 11/09/24 0938  Notify Provider  Until Discontinued,   Status:  Canceled         11/09/24 0938 11/09/24 0937  ePHEDrine injection 5 mg  Every 10 Minutes PRN,   Status:  Discontinued         11/09/24 0938 11/09/24 0937  ondansetron (ZOFRAN) injection 4 mg  Once As Needed         11/09/24 0938 11/09/24 0937  famotidine (PEPCID) injection 20 mg  Once As Needed,   Status:  Discontinued         11/09/24 0938 11/09/24 0900  sodium chloride 0.9 % flush 10 mL  Every 12 Hours Scheduled,   Status:  Discontinued         11/09/24 0728 11/09/24 0846  RPR Qualitative with Reflex to Quant  Once         11/09/24 0849    11/09/24 0832  Comprehensive Metabolic Panel  Once         11/09/24 0728 11/09/24 0815  lactated ringers infusion  Continuous,   Status:  Discontinued         11/09/24 0728 11/09/24 0815  penicillin G potassium 5 Million Units in sodium chloride 0.9 % 100 mL MBP  Once        Placed in \"Followed by\" Linked Group    11/09/24 0728 11/09/24 0815  oxytocin (PITOCIN) 30 units in 0.9% sodium chloride 500 mL (premix)  Titrated,   Status:  Discontinued         11/09/24 0728 11/09/24 0726  Place Sequential Compression Device  Once,   Status:  Canceled         11/09/24 0728 11/09/24 0726  Maintain Sequential Compression Device  Continuous,   Status:  Canceled         11/09/24 0728 11/09/24 0726  Diet: Liquid; Clear Liquid; Fluid Consistency: Thin (IDDSI 0)  Diet Effective Now,   Status:  Canceled         11/09/24 0728 11/09/24 0725  Admit To Obstetrics Inpatient  Once         11/09/24 0728 11/09/24 0725  Code Status and Medical Interventions: CPR (Attempt to Resuscitate); Full Support  Continuous,   Status:  Canceled         11/09/24 0728 11/09/24 0725  Obtain Informed Consent  Once,  "  Status:  Canceled         2428    24  Vital Signs Per Hospital Policy  Per Hospital Policy,   Status:  Canceled         24  Mini-Prep Prior to Delivery  Once,   Status:  Canceled         24  Continuous Fetal Monitoring With NST on Admission and Prior to Initiation of Oxytocin.  Per Order Details,   Status:  Canceled        Comments: Continuous Fetal Monitoring With NST on Admission & Prior to Initiation of Oxytocin.    24  External Uterine Contraction Monitoring  Per Hospital Policy,   Status:  Canceled         24  Notify Provider (Specified)  Until Discontinued,   Status:  Canceled         24  Notify Provider of Tachysystole (Per Hospital Algorithm)  Until Discontinued,   Status:  Canceled         24  Notify Provider if Membranes Ruptured, Bleeding Greater Than 1 Pad Per Hour, Fetal Heart Tone Abnormality or Severe Pain  Until Discontinued,   Status:  Canceled         24  Initiate Group Beta Strep (GBS) Prophylaxis Protocol, If Criteria Met  Continuous,   Status:  Canceled        Comments: NO TREATMENT RECOMMENDED IF: 1) Maternal GBS Status Known Negative 2) Scheduled  Birth With Intact Membranes, Not in Labor 3) Maternal GBS Status Unknown, No Risk Factors  TREAT WITH ANTIBIOTICS IF:  1) Maternal GBS Status Known Positive 2) Maternal GBS Status Unknown With Risk Factors: a)  Previous Infant Affected By GBS Infection b) GBS Urinary Tract Infection (UTI) or Bacteriuria During Pregnancy c) Unexplained Maternal Fever (100.4F (38C) or Greater) During Labor d)  Prolonged Rupture of Membranes (18 or More Hours) e) Gestational Age Less Than 37 Weeks    24  Inpatient Anesthesiology Consult  Once,   Status:  Canceled        Specialty:  Anesthesiology  Provider:  (Not yet  "assigned)    11/09/24 0728 11/09/24 0725  Treponema pallidum AB w/Reflex RPR  Once         11/09/24 0728 11/09/24 0725  CBC & Differential  Once         11/09/24 0728 11/09/24 0725  Type & Screen  Once         11/09/24 0728 11/09/24 0725  Insert Peripheral IV  Once,   Status:  Canceled         11/09/24 0728 11/09/24 0725  Saline Lock & Maintain IV Access  Continuous,   Status:  Canceled         11/09/24 0728 11/09/24 0725  CBC Auto Differential  PROCEDURE ONCE         11/09/24 0728 11/09/24 0724  sodium chloride 0.9 % flush 10 mL  As Needed,   Status:  Discontinued         11/09/24 0728 11/09/24 0724  sodium chloride 0.9 % infusion 40 mL  As Needed,   Status:  Discontinued         11/09/24 0728 11/09/24 0724  lidocaine (XYLOCAINE) 1 % injection 0.5 mL  Once As Needed,   Status:  Discontinued         11/09/24 0728 11/09/24 0724  lactated ringers bolus 1,000 mL  Once As Needed         11/09/24 0728 11/09/24 0724  acetaminophen (TYLENOL) tablet 650 mg  Every 4 Hours PRN,   Status:  Discontinued         11/09/24 0728 11/09/24 0724  ondansetron ODT (ZOFRAN-ODT) disintegrating tablet 4 mg  Every 6 Hours PRN,   Status:  Discontinued        Placed in \"Or\" Linked Group    11/09/24 0728 11/09/24 0724  ondansetron (ZOFRAN) injection 4 mg  Every 6 Hours PRN,   Status:  Discontinued        Placed in \"Or\" Linked Group    11/09/24 0728 11/09/24 0724  terbutaline (BRETHINE) injection 0.25 mg  As Needed,   Status:  Discontinued         11/09/24 0728 11/09/24 0724  mineral oil liquid 30 mL  Once As Needed,   Status:  Discontinued         11/09/24 0728 11/09/24 0724  famotidine (PEPCID) injection 20 mg  2 Times Daily PRN,   Status:  Discontinued        Placed in \"Or\" Linked Group    11/09/24 0728 11/09/24 0724  famotidine (PEPCID) tablet 20 mg  2 Times Daily PRN,   Status:  Discontinued        Placed in \"Or\" Linked Group    11/09/24 0728 11/09/24 0724  Position Change - For " Intra-Uterine Resusitation for Hypertonus, HyperStimulation or Non-Reassuring Fetal Status  As Needed,   Status:  Canceled       11/09/24 0728    11/09/24 0548  Urine Culture - Urine, Urine, Clean Catch  Once        Comments: Collect and Hold for gestational age > 24 weeks      11/09/24 0547    11/09/24 0512  Vital Signs  Per Hospital Policy,   Status:  Canceled        Comments: Repeat blood pressure every 30 minutes, until specified.    11/09/24 0514    11/09/24 0512  Fetal Nonstress Test  Once,   Status:  Canceled        Comments: For any patient >= 24 wks gestation.    11/09/24 0514    11/09/24 0512  Pulse Oximetry, Spot  Once,   Status:  Canceled         11/09/24 0514    11/09/24 0512  POC Glucose STAT  STAT,   Status:  Canceled        Comments: For any patient with any type of diabetes.      11/09/24 0514    11/09/24 0512  Urinalysis With Culture If Indicated - Urine, Clean Catch  Once        Comments: Collect and Hold for gestational age > 24 weeks      11/09/24 0514    11/09/24 0512  Continuous Uterine Monitoring - For Gestational Age >24 weeks  Once,   Status:  Canceled         11/09/24 0514    11/09/24 0512  NPO Diet NPO Type: Strict NPO  Diet Effective Now,   Status:  Canceled         11/09/24 0514    11/09/24 0512  Vaginal Exam  Once,   Status:  Canceled        Comments: Perform unless patient has vaginal bleeding without known placental site, known placental previa, <36 weeks with no abdominal , or complain of ROM and <36 weeks    11/09/24 0514    11/09/24 0512  POC PH Fluid (Nitrazine)  Once,   Status:  Canceled        Comments: If patient is presenting with possible ROM or leaking      11/09/24 0514    11/09/24 0512  Rapid Assay For ROM - Amniotic Fluid, Amniotic Sac  Once         11/09/24 0514    11/09/24 0000  ABO / Rh        Comments: This is an external result entered through the Results Console.      11/09/24 0946    11/09/24 0000  Hepatitis C Antibody        Comments: This is an external result  entered through the Results Console.      24 0000  Hepatitis B Surface Antigen        Comments: This is an external result entered through the Results Console.      24 0000  RPR Qualitative with Reflex to Quant        Comments: This is an external result entered through the Results Console.      24 0000  Rubella Antibody, IgG        Comments: This is an external result entered through the Results Console.      24 0000  HIV-1 Antibody, EIA        Comments: This is an external result entered through the Results Console.      24 0000  Group B Streptococcus Culture - Swab, Vaginal/Rectum        Comments: This is an external result entered through the Results Console.      24    Unscheduled  Up with Assistance  As Needed       11/10/24 2205    Unscheduled  Bladder Scan if Patient Unable to Void 4-6 Hours After Catheter Removal  As Needed         11/10/24 2205    Unscheduled  Straight Cath Every 4-6 Hours As Needed If Patient is Unable to Void After 4-6 Hours, Bladder Scan Volume is Greater Than 500mL & Patient Has Symptoms of Bladder Discomfort / Distention  As Needed       11/10/24 2205    Unscheduled  Schedule / Prompt Voiding For Patients With Urinary Incontinence  As Needed       11/10/24 2205    Unscheduled  Chewing Gum  As Needed       11/10/24 2205    Unscheduled  Apply witch hazel pads / TUCKS to perineum as needed for comfort PRN  As Needed       11/10/24 2205    Unscheduled  Warm compress  As Needed       11/10/24 2205    Unscheduled  Apply ace wrap, tight bra, or binder  As Needed       11/10/24 2205    Unscheduled  Apply ice packs  As Needed       11/10/24 2205                     Operative/Procedure Notes (last 72 hours)        Kenzie Schultz MD at 11/10/24 1652          [facility]   Section Operative Note    Patient Name: Chary Rendon  :  1990  MRN:   [id number]      Date of procedure:  11/10/2024       Pre-Operative Dx:   1.  IUP at 40w1d weeks   2. Failure to Progress        Postoperative Dx:  Same        Procedure: primary   via Pfannensteil     Surgeon: Kenzie Schultz MD      Assistant: Gabriela Rucker MD     Anesthesia: Epidural     EBL:    Quantitative EBL:  600mL     580 mL         Specimens: A: Placenta     Findings:                           Amniotic Fluid - clear  Placenta Intact, 3 VC  Uterus normal  Tubes and ovaries normal bilaterally              Infant:           Gender: Viable male infant     Weight: 3950 g (8 lb 11.3 oz)    Apgars: 8  @ 1 minute /     9  @ 5 minutes                 Indication for C/Section:     Failure to Progress              Procedure Details:    The patient was taken to the operating room where epidural anesthesia was found to be adequate. She was then placed in the dorsal supine position with left lateral tilt and prepped and draped in the usual sterile fashion. Muñoz catheter was placed. Time out was performed. A Pfannenstiel skin incision was then made with the scalpel and carried through to the underlying layer of fascia. The fascia was incised in the midline and the incision extended laterally with the Warren scissors. The superior aspect of the fascial incision was was then grasped with the Kocher clamps, elevated, and the underlying rectus muscles dissected off bluntly. Attention was then turned to the inferior aspect of this incision, which, in similar fashion, was grasped with the Kocher clamps, and the rectus muscles dissected off bluntly. The rectus muscles were then  in the midline, and the peritoneum identified, tented up, and entered  bluntly.  On inspection, it was noted that the bladder was distended nearly to the level of the uterine fundus and the bladder serosa had been disrupted with entry to the peritoneum. A new Muñoz catheter was placed with successful decompression of  the bladder then  noted. The peritoneal incision was then extended superiorly and inferiorly with good visualization of the bladder.     The bladder blade was then inserted and the vesicouterine peritoneum identified grasped with the pick-ups and entered sharply with the Metzenbaum scissors. This incision was then extended laterally and the bladder flap created digitally. The bladder blade was then reinserted and the lower uterine segment incised in a transverse fashion with the scalpel. The uterine incision was then extended superolaterally bluntly. The bladder blade was removed and the infant's head delivered atraumatically, followed by delivery of the rest of the infant's body. There were no nuchal cords. The umbilical cord was clamped and cut and the infant was passed off to waiting pediatricians. Cord blood  was obtained and sent to pathology.     The placenta was then removed manually, the uterus exteriorized, and cleared of all clot and debris. The uterine incision was repaired with 0-Monocryl in a running locked fashion. A second imbricating layer of the same suture was used to obtain excellent hemostasis. The uterus was returned to the abdomen. The posterior cul de sac and paracolic gutters were cleared of all clot and debris. The uterus was re-examined and found to be hemostatic. At this time the disrupted bladder serosa was repaired with a simple running suture of 3-0 Vicryl. The bladder integrity was confirmed with instillation of sterile milk without any spill noted.  We then turned our attention to the muscle and fasical edges. Small bleeders were cauterized with Bovie electrocautery.  The fascia was reapproximated with 0-Vicryl in a running fashion. The subcutaneous tissue was infiltrated with an anesthetic solution containing ropivacaine, clonidine, and epinephrine and then reapproximated with 3-0 Monocryl. The skin was closed with 4-0 Monocryl.    Instrument, sponge, and needle counts were correct prior the abdominal  closure and at the conclusion of the case. Mother and baby  to recovery room in stable condition.            Complications:     Disruption of serosal layer of the dome of the bladder which was repaired          Antibiotics: cefazolin (Ancef) and Azithryomycin (Azithromax)                      Kenzie Schultz MD  11/10/2024  18:33 EST      Electronically signed by Kenzie Schultz MD at 11/10/24 2020       Kenzie Schultz MD at 11/10/24 1833          [facility]   Section Operative Note    Patient Name: Chary Rendon  :  1990  MRN:  [id number]      Date of procedure:  11/10/2024       Pre-Operative Dx:   1.  IUP at 40w1d weeks   2. Failure to Progress        Postoperative Dx:  Same        Procedure: primary   via Pfannensteil     Surgeon: Kenzie Schultz MD      Assistant: Gabriela Rucker MD     Anesthesia: Epidural     EBL:    Quantitative EBL:  600mL    Quantitative Blood Loss (mL): 580 mL         Specimens: A: Placenta     Findings:                           Amniotic Fluid - clear  Placenta Intact, 3 VC  Uterus normal  Tubes and ovaries normal bilaterally              Infant:           Gender: Viable male infant     Weight: 3950 g (8 lb 11.3 oz)    Apgars: 8  @ 1 minute /     9  @ 5 minutes                 Indication for C/Section:     Failure to Progress              Procedure Details:    The patient was taken to the operating room where epidural anesthesia was found to be adequate. She was then placed in the dorsal supine position with left lateral tilt and prepped and draped in the usual sterile fashion. Muñoz catheter was placed. Time out was performed. A Pfannenstiel skin incision was then made with the scalpel and carried through to the underlying layer of fascia. The fascia was incised in the midline and the incision extended laterally with the Warren scissors. The superior aspect of the fascial incision was was then grasped with the Kocher clamps, elevated, and the underlying  rectus muscles dissected off bluntly. Attention was then turned to the inferior aspect of this incision, which, in similar fashion, was grasped with the Kocher clamps, and the rectus muscles dissected off bluntly. The rectus muscles were then  in the midline, and the peritoneum identified, tented up, and entered  bluntly.  On inspection, it was noted that the bladder was distended nearly to the level of the uterine fundus and the bladder serosa had been disrupted with entry to the peritoneum. A new Muñoz catheter was placed with successful decompression of  the bladder then noted. The peritoneal incision was then extended superiorly and inferiorly with good visualization of the bladder.     The bladder blade was then inserted and the vesicouterine peritoneum identified grasped with the pick-ups and entered sharply with the Metzenbaum scissors. This incision was then extended laterally and the bladder flap created digitally. The bladder blade was then reinserted and the lower uterine segment incised in a transverse fashion with the scalpel. The uterine incision was then extended superolaterally bluntly. The bladder blade was removed and the infant's head delivered atraumatically, followed by delivery of the rest of the infant's body. There were no nuchal cords. The umbilical cord was clamped and cut and the infant was passed off to waiting pediatricians. Cord blood  was obtained and sent to pathology.     The placenta was then removed manually, the uterus exteriorized, and cleared of all clot and debris. The uterine incision was repaired with 0-Monocryl in a running locked fashion. A second imbricating layer of the same suture was used to obtain excellent hemostasis. The uterus was returned to the abdomen. The posterior cul de sac and paracolic gutters were cleared of all clot and debris. The uterus was re-examined and found to be hemostatic. At this time the disrupted bladder serosa was repaired with a  simple running suture of 3-0 Vicryl. The bladder integrity was confirmed with instillation of sterile milk without any spill noted.  We then turned our attention to the muscle and fasical edges. Small bleeders were cauterized with Bovie electrocautery.  The fascia was reapproximated with 0-Vicryl in a running fashion. The subcutaneous tissue was infiltrated with an anesthetic solution containing ropivacaine, clonidine, and epinephrine and then reapproximated with 3-0 Monocryl. The skin was closed with 4-0 Monocryl.    Instrument, sponge, and needle counts were correct prior the abdominal closure and at the conclusion of the case. Mother and baby  to recovery room in stable condition.            Complications:     Disruption of serosal layer of the dome of the bladder which was repaired          Antibiotics: cefazolin (Ancef) and Azithryomycin (Azithromax)                      Kenzie Schultz MD  11/10/2024  16:52 EST      Electronically signed by Kenzie Schultz MD at 11/10/24 2019          Physician Progress Notes (last 72 hours)        Kenzie Schultz MD at 11/10/24 1002          Assumed care of patient as of  0700. Presented 11/9/24 with SROM- has been on pitocin for augmentation. Has been 5cm since approx midnight- understands currently protracted latent labor however given not et in active labor has 12-18hrs unchanged (on pitocin) before would recommend proceeding with primary c/s for failed labor augmentation/arrest of latent phase.     Kenzie Schultz MD  11/10/24  10:04 EST     Electronically signed by Kenzie Schultz MD at 11/10/24 1005       Consult Notes (last 72 hours)  Notes from 11/08/24 0807 through 11/11/24 0807   No notes of this type exist for this encounter.

## 2024-11-12 VITALS
RESPIRATION RATE: 16 BRPM | HEIGHT: 71 IN | HEART RATE: 74 BPM | OXYGEN SATURATION: 99 % | TEMPERATURE: 97.9 F | DIASTOLIC BLOOD PRESSURE: 83 MMHG | BODY MASS INDEX: 28.34 KG/M2 | SYSTOLIC BLOOD PRESSURE: 132 MMHG | WEIGHT: 202.4 LBS

## 2024-11-12 PROBLEM — O42.90 PROM (PREMATURE RUPTURE OF MEMBRANES): Status: RESOLVED | Noted: 2024-11-10 | Resolved: 2024-11-12

## 2024-11-12 PROBLEM — Z34.90 PREGNANCY: Status: RESOLVED | Noted: 2024-11-09 | Resolved: 2024-11-12

## 2024-11-12 RX ORDER — AMOXICILLIN 250 MG
1 CAPSULE ORAL 2 TIMES DAILY
Qty: 60 TABLET | Refills: 0 | Status: SHIPPED | OUTPATIENT
Start: 2024-11-12

## 2024-11-12 RX ORDER — ACETAMINOPHEN 325 MG/1
650 TABLET ORAL EVERY 6 HOURS
Qty: 40 TABLET | Refills: 0 | Status: SHIPPED | OUTPATIENT
Start: 2024-11-12

## 2024-11-12 RX ORDER — OXYCODONE HYDROCHLORIDE 5 MG/1
5 TABLET ORAL EVERY 6 HOURS PRN
Qty: 12 TABLET | Refills: 0 | Status: SHIPPED | OUTPATIENT
Start: 2024-11-12 | End: 2024-11-15

## 2024-11-12 RX ORDER — IBUPROFEN 600 MG/1
600 TABLET, FILM COATED ORAL EVERY 6 HOURS
Qty: 40 TABLET | Refills: 0 | Status: SHIPPED | OUTPATIENT
Start: 2024-11-12

## 2024-11-12 RX ADMIN — IBUPROFEN 600 MG: 600 TABLET, FILM COATED ORAL at 04:54

## 2024-11-12 RX ADMIN — LEVOTHYROXINE SODIUM 25 MCG: 25 TABLET ORAL at 11:20

## 2024-11-12 RX ADMIN — SENNOSIDES AND DOCUSATE SODIUM 1 TABLET: 50; 8.6 TABLET ORAL at 11:19

## 2024-11-12 RX ADMIN — ACETAMINOPHEN 325MG 650 MG: 325 TABLET ORAL at 07:43

## 2024-11-12 RX ADMIN — ACETAMINOPHEN 325MG 650 MG: 325 TABLET ORAL at 13:16

## 2024-11-12 RX ADMIN — ESCITALOPRAM OXALATE 10 MG: 10 TABLET, FILM COATED ORAL at 11:20

## 2024-11-12 RX ADMIN — IBUPROFEN 600 MG: 600 TABLET, FILM COATED ORAL at 11:20

## 2024-11-12 RX ADMIN — ACETAMINOPHEN 325MG 650 MG: 325 TABLET ORAL at 00:21

## 2024-11-12 NOTE — PROGRESS NOTES
"[facility]   PROGRESS NOTE    Patient Name: Chary Rendon  :  1990  MRN:  [id number]      Post-Op Day 2 S/P    Delivered a male infant.  Subjective     Patient reports:    Pain is well controlled. Voiding and ambulating without difficulty.    Tolerating po. Lochia normal.       The patient plans to breastfeed, lactation referral.         Objective       Vitals: Vital Signs Range for the last 24 hours  Temperature: Temp:  [97.6 °F (36.4 °C)-98.4 °F (36.9 °C)] 98.4 °F (36.9 °C)   Temp Source: Temp src: Oral   BP: BP: (124-127)/(74-80) 124/74   Pulse: Heart Rate:  [66-77] 77   Respirations: Resp:  [16-18] 16         Intake/Output Summary (Last 24 hours) at 2024 1003  Last data filed at 2024 0330  Gross per 24 hour   Intake 240 ml   Output 2250 ml   Net -2010 ml                                              Physical Exam     General Alert and awake, in NAD      CV Regular rate and rhythm      Lungs clear to auscultation bilaterally        Abdomen Soft, non-distended, fundus firm,  2fb below umbilicus, appropriately tender      Incision  Dressing clean, dry and intact.      Extremities  trace edema, calves NT               LABS: Results from last 7 days   Lab Units 24  0659 24  0809   WBC 10*3/mm3 19.49* 11.04*   HEMOGLOBIN g/dL 12.1 14.0   HEMATOCRIT % 36.2 40.0   PLATELETS 10*3/mm3 158 173         Prenatal labs results reviewed:  Yes   Rubella:  immune  Rh Status:    No results found for: \"RH\"                      Assessment & Plan   :     1. POD 2 S/P C/S: Hemodynamically stable.  Doing well.  Continue routine care.     2. Male infant: doing well, s/p circ                Tahira Pereira, APRN  2024  10:03 EST      "

## 2024-11-12 NOTE — DISCHARGE SUMMARY
Patient Name: Chary Rendon  :  1990  MRN:  [id number]    Date of Discharge:  2024    Discharge Diagnosis:   Status post primary low transverse  section      Presenting Problem/History of Present Illness  Pregnancy [Z34.90]       Hospital Course  Patient is a 34 y.o. female presented  with SROM at term. Due to failure to progress, decision was made to proceed with  delivery. On 11/10/24,  delivery by Dr. Schultz of male infant weighing 8lb 11.3oz. Her post-operative course was uncomplicated and on POD 2 she was requesting discharge home. She was meeting all criteria for discharge including adequate pain control, minimal lochia, and ability to ambulate, void and tolerate PO intake without difficulty. She was discharged home with standard discharge precautions and instructions.       Procedures Performed  Procedure(s):   SECTION PRIMARY           Condition on Discharge:  Post-Op Day 2 S/P   Subjective     Patient reports:    Doing well - ready for discharge. Pain well controlled. Tolerating po and   having flatus. Voiding and ambulating without difficulty. Lochia normal.       Vital Signs  Temp:  [98 °F (36.7 °C)-98.4 °F (36.9 °C)] 98.4 °F (36.9 °C)  Heart Rate:  [74-77] 77  Resp:  [16-18] 16  BP: (124)/(74-78) 124/74    Physical Exam:     Gen: Alert and awake   Abdomen: Soft, ND,  Fundus firm with minimal tenderness   Incision : Intact, without erythema or exudate   Extremities: Calves NT bilaterally         Results from last 7 days   Lab Units 24  0659 24  0809   WBC 10*3/mm3 19.49* 11.04*   HEMOGLOBIN g/dL 12.1 14.0   HEMATOCRIT % 36.2 40.0   PLATELETS 10*3/mm3 158 173             POD 2 s/p : stable for dc, routine instructions reviewed.     Consults:   Consults       No orders found from 10/11/2024 to 11/10/2024.            Discharge Disposition  Home or Self Care    Discharge Medications     Discharge Medications        New Medications         Instructions Start Date   acetaminophen 325 MG tablet  Commonly known as: TYLENOL   650 mg, Oral, Every 6 Hours      ibuprofen 600 MG tablet  Commonly known as: ADVIL,MOTRIN   600 mg, Oral, Every 6 Hours      oxyCODONE 5 MG immediate release tablet  Commonly known as: ROXICODONE   5 mg, Oral, Every 6 Hours PRN      sennosides-docusate 8.6-50 MG per tablet  Commonly known as: PERICOLACE   1 tablet, Oral, 2 Times Daily             Changes to Medications        Instructions Start Date   levothyroxine 25 MCG tablet  Commonly known as: SYNTHROID, LEVOTHROID  What changed: Another medication with the same name was removed. Continue taking this medication, and follow the directions you see here.   Take 1 tablet by mouth Daily 30 minutes prior to food.             Continue These Medications        Instructions Start Date   escitalopram 10 MG tablet  Commonly known as: LEXAPRO   10 mg, Oral, Daily      valACYclovir 500 MG tablet  Commonly known as: Valtrex   500 mg, Oral, Daily             Stop These Medications      clobetasol propionate 0.05 % cream  Commonly known as: TEMOVATE              The patient has been prescribed a controlled substance.  She has been counseled on the risks associated with using the medication.   The addictive potential of this medication and alternatives were discussed carefully with this patient and she demonstrated understanding.  A [facility] report has been obtained and reviewed.         Activity at Discharge:     Follow-up Appointments    Telehealth: 2 weeks  Postpartum Exam: 6 weeks      Test Results Pending at Discharge       ALLEY Monson  11/12/24  12:53 EST

## 2024-11-12 NOTE — PLAN OF CARE
Goal Outcome Evaluation:  Plan of Care Reviewed With: patient        Progress: improving  Outcome Evaluation: vss. ambulating independently. pt showered, incisional dressing removed. pain controlled with eras meds. IV removed. bonding well with infant.

## 2024-11-12 NOTE — LACTATION NOTE
This note was copied from a baby's chart.  PT is going home today. Reports baby is BF well, but she is also supplementing with formula. Encouraged her always to offer breast first and then bottle. Educated on the importance of stimulation for adequate milk supply. Informed mom about the \[facility] [place] [facility]\"" info on the back of the edicational booklet. Discussed engourgement, pumping, milk storage and when to expect mature milk. PT denieis any questions.

## 2025-08-15 ENCOUNTER — PATIENT ROUNDING (BHMG ONLY) (OUTPATIENT)
Dept: INTERNAL MEDICINE | Facility: CLINIC | Age: 35
End: 2025-08-15
Payer: COMMERCIAL

## 2025-08-15 ENCOUNTER — LAB (OUTPATIENT)
Facility: HOSPITAL | Age: 35
End: 2025-08-15
Payer: COMMERCIAL

## 2025-08-15 ENCOUNTER — OFFICE VISIT (OUTPATIENT)
Dept: INTERNAL MEDICINE | Facility: CLINIC | Age: 35
End: 2025-08-15
Payer: COMMERCIAL

## 2025-08-15 VITALS
TEMPERATURE: 98.4 F | DIASTOLIC BLOOD PRESSURE: 72 MMHG | WEIGHT: 187 LBS | HEIGHT: 71 IN | SYSTOLIC BLOOD PRESSURE: 110 MMHG | BODY MASS INDEX: 26.18 KG/M2

## 2025-08-15 DIAGNOSIS — Z00.00 ANNUAL PHYSICAL EXAM: Primary | ICD-10-CM

## 2025-08-15 PROBLEM — Z98.891 STATUS POST PRIMARY LOW TRANSVERSE CESAREAN SECTION: Status: RESOLVED | Noted: 2024-11-10 | Resolved: 2025-08-15

## 2025-08-15 LAB
25(OH)D3 SERPL-MCNC: 40.7 NG/ML (ref 30–100)
ALBUMIN SERPL-MCNC: 4.7 G/DL (ref 3.5–5.2)
ALBUMIN/GLOB SERPL: 1.7 G/DL
ALP SERPL-CCNC: 58 U/L (ref 39–117)
ALT SERPL W P-5'-P-CCNC: 22 U/L (ref 1–33)
ANION GAP SERPL CALCULATED.3IONS-SCNC: 9 MMOL/L (ref 5–15)
AST SERPL-CCNC: 23 U/L (ref 1–32)
BASOPHILS # BLD AUTO: 0.03 10*3/MM3 (ref 0–0.2)
BASOPHILS NFR BLD AUTO: 0.5 % (ref 0–1.5)
BILIRUB SERPL-MCNC: 0.3 MG/DL (ref 0–1.2)
BUN SERPL-MCNC: 20 MG/DL (ref 6–20)
BUN/CREAT SERPL: 18.3 (ref 7–25)
CALCIUM SPEC-SCNC: 9.4 MG/DL (ref 8.6–10.5)
CHLORIDE SERPL-SCNC: 106 MMOL/L (ref 98–107)
CHOLEST SERPL-MCNC: 157 MG/DL (ref 0–200)
CO2 SERPL-SCNC: 24 MMOL/L (ref 22–29)
CREAT SERPL-MCNC: 1.09 MG/DL (ref 0.57–1)
DEPRECATED RDW RBC AUTO: 40.6 FL (ref 37–54)
EGFRCR SERPLBLD CKD-EPI 2021: 68.5 ML/MIN/1.73
EOSINOPHIL # BLD AUTO: 0.19 10*3/MM3 (ref 0–0.4)
EOSINOPHIL NFR BLD AUTO: 3.5 % (ref 0.3–6.2)
ERYTHROCYTE [DISTWIDTH] IN BLOOD BY AUTOMATED COUNT: 11.8 % (ref 12.3–15.4)
GLOBULIN UR ELPH-MCNC: 2.7 GM/DL
GLUCOSE SERPL-MCNC: 98 MG/DL (ref 65–99)
HCT VFR BLD AUTO: 45.4 % (ref 34–46.6)
HDLC SERPL QL: 3.57
HDLC SERPL-MCNC: 44 MG/DL (ref 40–60)
HGB BLD-MCNC: 14.5 G/DL (ref 12–15.9)
IMM GRANULOCYTES # BLD AUTO: 0.01 10*3/MM3 (ref 0–0.05)
IMM GRANULOCYTES NFR BLD AUTO: 0.2 % (ref 0–0.5)
LDLC SERPL CALC-MCNC: 101 MG/DL (ref 0–100)
LYMPHOCYTES # BLD AUTO: 1.69 10*3/MM3 (ref 0.7–3.1)
LYMPHOCYTES NFR BLD AUTO: 30.8 % (ref 19.6–45.3)
MCH RBC QN AUTO: 29.4 PG (ref 26.6–33)
MCHC RBC AUTO-ENTMCNC: 31.9 G/DL (ref 31.5–35.7)
MCV RBC AUTO: 92.1 FL (ref 79–97)
MONOCYTES # BLD AUTO: 0.44 10*3/MM3 (ref 0.1–0.9)
MONOCYTES NFR BLD AUTO: 8 % (ref 5–12)
NEUTROPHILS NFR BLD AUTO: 3.13 10*3/MM3 (ref 1.7–7)
NEUTROPHILS NFR BLD AUTO: 57 % (ref 42.7–76)
NRBC BLD AUTO-RTO: 0 /100 WBC (ref 0–0.2)
PLATELET # BLD AUTO: 198 10*3/MM3 (ref 140–450)
PMV BLD AUTO: 10.5 FL (ref 6–12)
POTASSIUM SERPL-SCNC: 4.8 MMOL/L (ref 3.5–5.2)
PROT SERPL-MCNC: 7.4 G/DL (ref 6–8.5)
RBC # BLD AUTO: 4.93 10*6/MM3 (ref 3.77–5.28)
SODIUM SERPL-SCNC: 139 MMOL/L (ref 136–145)
TRIGL SERPL-MCNC: 60 MG/DL (ref 0–150)
VLDLC SERPL-MCNC: 12 MG/DL (ref 5–40)
WBC NRBC COR # BLD AUTO: 5.49 10*3/MM3 (ref 3.4–10.8)

## 2025-08-15 PROCEDURE — 80053 COMPREHEN METABOLIC PANEL: CPT | Performed by: PHYSICIAN ASSISTANT

## 2025-08-15 PROCEDURE — 82306 VITAMIN D 25 HYDROXY: CPT | Performed by: PHYSICIAN ASSISTANT

## 2025-08-15 PROCEDURE — 85025 COMPLETE CBC W/AUTO DIFF WBC: CPT | Performed by: PHYSICIAN ASSISTANT

## 2025-08-15 PROCEDURE — 36415 COLL VENOUS BLD VENIPUNCTURE: CPT | Performed by: PHYSICIAN ASSISTANT

## 2025-08-15 PROCEDURE — 80061 LIPID PANEL: CPT | Performed by: PHYSICIAN ASSISTANT

## 2025-08-15 PROCEDURE — 99385 PREV VISIT NEW AGE 18-39: CPT | Performed by: PHYSICIAN ASSISTANT

## (undated) DEVICE — Device: Brand: PORTEX

## (undated) DEVICE — SUT MNCRYL PLS ANTIB UD 4/0 PS2 18IN

## (undated) DEVICE — SUT MONOCRYL PLS 3/0 CT1 UD/MF 90CM MCP944H

## (undated) DEVICE — ANTIBACTERIAL UNDYED BRAIDED (POLYGLACTIN 910), SYNTHETIC ABSORBABLE SUTURE: Brand: COATED VICRYL

## (undated) DEVICE — SOL IRR NACL 0.9PCT BO 1000ML

## (undated) DEVICE — DEV BALN C/SECT FETALPILLOW SILCNE

## (undated) DEVICE — GLV SURG SENSICARE PI MIC PF SZ6 LF STRL

## (undated) DEVICE — SUT GUT CHRM 0 CT 27IN 914H

## (undated) DEVICE — SUT MNCRYL 0/0 CTX 36IN Y398H

## (undated) DEVICE — NDL BLNT 18G 1 1/2IN

## (undated) DEVICE — SLV SCD CALF HEMOFORCE DVT THERP REPROC MD